# Patient Record
Sex: FEMALE | Race: ASIAN | Employment: UNEMPLOYED | URBAN - METROPOLITAN AREA
[De-identification: names, ages, dates, MRNs, and addresses within clinical notes are randomized per-mention and may not be internally consistent; named-entity substitution may affect disease eponyms.]

---

## 2017-01-29 ENCOUNTER — HOSPITAL ENCOUNTER (EMERGENCY)
Age: 32
Discharge: HOME | End: 2017-01-29
Attending: EMERGENCY MEDICINE

## 2017-01-29 VITALS
RESPIRATION RATE: 20 BRPM | SYSTOLIC BLOOD PRESSURE: 101 MMHG | DIASTOLIC BLOOD PRESSURE: 46 MMHG | HEIGHT: 63 IN | BODY MASS INDEX: 22.15 KG/M2 | HEART RATE: 94 BPM | TEMPERATURE: 100 F | OXYGEN SATURATION: 100 % | WEIGHT: 125 LBS

## 2017-01-29 DIAGNOSIS — R19.7 NAUSEA VOMITING AND DIARRHEA: Primary | ICD-10-CM

## 2017-01-29 DIAGNOSIS — R11.2 NAUSEA VOMITING AND DIARRHEA: Primary | ICD-10-CM

## 2017-01-29 DIAGNOSIS — E87.6 HYPOKALEMIA: ICD-10-CM

## 2017-01-29 LAB
BASOPHILS # BLD AUTO: 0.02 X10(3) UL (ref 0–0.1)
BASOPHILS NFR BLD AUTO: 0.2 %
BILIRUB UR QL STRIP.AUTO: NEGATIVE
BUN BLD-MCNC: 16 MG/DL (ref 8–20)
CALCIUM BLD-MCNC: 8.1 MG/DL (ref 8.3–10.3)
CHLORIDE: 110 MMOL/L (ref 101–111)
CLARITY UR REFRACT.AUTO: CLEAR
CO2: 19 MMOL/L (ref 22–32)
COLOR UR AUTO: YELLOW
CREAT BLD-MCNC: 0.94 MG/DL (ref 0.55–1.02)
EOSINOPHIL # BLD AUTO: 0.03 X10(3) UL (ref 0–0.3)
EOSINOPHIL NFR BLD AUTO: 0.2 %
ERYTHROCYTE [DISTWIDTH] IN BLOOD BY AUTOMATED COUNT: 15 % (ref 11.5–16)
GLUCOSE BLD-MCNC: 104 MG/DL (ref 70–99)
GLUCOSE UR STRIP.AUTO-MCNC: NEGATIVE MG/DL
HCT VFR BLD AUTO: 38.5 % (ref 34–50)
HGB BLD-MCNC: 12.6 G/DL (ref 12–16)
IMMATURE GRANULOCYTE COUNT: 0.05 X10(3) UL (ref 0–1)
IMMATURE GRANULOCYTE RATIO %: 0.4 %
KETONES UR STRIP.AUTO-MCNC: NEGATIVE MG/DL
LEUKOCYTE ESTERASE UR QL STRIP.AUTO: NEGATIVE
LYMPHOCYTES # BLD AUTO: 0.29 X10(3) UL (ref 0.9–4)
LYMPHOCYTES NFR BLD AUTO: 2.4 %
MCH RBC QN AUTO: 27.2 PG (ref 27–33.2)
MCHC RBC AUTO-ENTMCNC: 32.7 G/DL (ref 31–37)
MCV RBC AUTO: 83 FL (ref 81–100)
MONOCYTES # BLD AUTO: 0.32 X10(3) UL (ref 0.1–0.6)
MONOCYTES NFR BLD AUTO: 2.7 %
NEUTROPHIL ABS PRELIM: 11.32 X10 (3) UL (ref 1.3–6.7)
NEUTROPHILS # BLD AUTO: 11.32 X10(3) UL (ref 1.3–6.7)
NEUTROPHILS NFR BLD AUTO: 94.1 %
NITRITE UR QL STRIP.AUTO: NEGATIVE
PH UR STRIP.AUTO: 8.5 [PH] (ref 4.5–8)
PLATELET # BLD AUTO: 342 10(3)UL (ref 150–450)
POCT LOT NUMBER: NORMAL
POCT URINE PREGNANCY: NEGATIVE
POTASSIUM SERPL-SCNC: 3.1 MMOL/L (ref 3.6–5.1)
RBC # BLD AUTO: 4.64 X10(6)UL (ref 3.8–5.1)
RBC UR QL AUTO: NEGATIVE
RED CELL DISTRIBUTION WIDTH-SD: 45.1 FL (ref 35.1–46.3)
SODIUM SERPL-SCNC: 139 MMOL/L (ref 136–144)
SP GR UR STRIP.AUTO: 1.01 (ref 1–1.03)
UROBILINOGEN UR STRIP.AUTO-MCNC: 0.2 MG/DL
WBC # BLD AUTO: 12 X10(3) UL (ref 4–13)

## 2017-01-29 PROCEDURE — 85025 COMPLETE CBC W/AUTO DIFF WBC: CPT | Performed by: EMERGENCY MEDICINE

## 2017-01-29 PROCEDURE — 80048 BASIC METABOLIC PNL TOTAL CA: CPT | Performed by: EMERGENCY MEDICINE

## 2017-01-29 PROCEDURE — 99284 EMERGENCY DEPT VISIT MOD MDM: CPT

## 2017-01-29 PROCEDURE — 81025 URINE PREGNANCY TEST: CPT

## 2017-01-29 PROCEDURE — 96361 HYDRATE IV INFUSION ADD-ON: CPT

## 2017-01-29 PROCEDURE — 81001 URINALYSIS AUTO W/SCOPE: CPT | Performed by: EMERGENCY MEDICINE

## 2017-01-29 PROCEDURE — 96374 THER/PROPH/DIAG INJ IV PUSH: CPT

## 2017-01-29 RX ORDER — ONDANSETRON 4 MG/1
4 TABLET, ORALLY DISINTEGRATING ORAL EVERY 4 HOURS PRN
Qty: 10 TABLET | Refills: 0 | Status: SHIPPED | OUTPATIENT
Start: 2017-01-29 | End: 2017-02-05

## 2017-01-29 RX ORDER — ONDANSETRON 2 MG/ML
4 INJECTION INTRAMUSCULAR; INTRAVENOUS ONCE
Status: COMPLETED | OUTPATIENT
Start: 2017-01-29 | End: 2017-01-29

## 2017-01-29 RX ORDER — POTASSIUM CHLORIDE 20 MEQ/1
40 TABLET, EXTENDED RELEASE ORAL ONCE
Status: COMPLETED | OUTPATIENT
Start: 2017-01-29 | End: 2017-01-29

## 2017-01-29 NOTE — ED PROVIDER NOTES
Patient Seen in: THE [facility] Emergency Department In [place]    History   Patient presents with:  Nausea/Vomiting/Diarrhea (gastrointestinal)    Stated Complaint: vomiting and diarrhea    HPI    There is a 79-year-old female coming with complaints of vomit Alcohol Use: No                Review of Systems    Positive for stated complaint: vomiting and diarrhea  Other systems are as noted in HPI. Constitutional and vital signs reviewed.       All other systems reviewed and negative except as note Protein Urine 30 mg/dL (*)     All other components within normal limits   CBC W/ DIFFERENTIAL - Abnormal; Notable for the following:     Neutrophil Absolute Prelim 11.32 (*)     Neutrophil Absolute 11.32 (*)     Lymphocyte Absolute 0.29 (*)     All other

## 2018-07-08 ENCOUNTER — HOSPITAL ENCOUNTER (EMERGENCY)
Age: 33
Discharge: HOME | End: 2018-07-08
Attending: EMERGENCY MEDICINE

## 2018-07-08 VITALS
DIASTOLIC BLOOD PRESSURE: 55 MMHG | BODY MASS INDEX: 24.63 KG/M2 | SYSTOLIC BLOOD PRESSURE: 127 MMHG | HEART RATE: 65 BPM | OXYGEN SATURATION: 98 % | TEMPERATURE: 98 F | RESPIRATION RATE: 18 BRPM | HEIGHT: 63 IN | WEIGHT: 139 LBS

## 2018-07-08 DIAGNOSIS — G56.01 CARPAL TUNNEL SYNDROME OF RIGHT WRIST: Primary | ICD-10-CM

## 2018-07-08 PROCEDURE — 99282 EMERGENCY DEPT VISIT SF MDM: CPT

## 2018-07-08 RX ORDER — IBUPROFEN 600 MG/1
600 TABLET ORAL ONCE
Status: COMPLETED | OUTPATIENT
Start: 2018-07-08 | End: 2018-07-08

## 2018-07-08 NOTE — ED PROVIDER NOTES
Patient Seen in: [facility] Emergency Department In [place]    History   Patient presents with:  Pain (neurologic)    Stated Complaint: right wrist pain since last night, denies injury    HPI    74-year-old female presents emergency room with chief compla HPI.  Constitutional and vital signs reviewed. All other systems reviewed and negative except as noted above.     Physical Exam   ED Triage Vitals [07/08/18 0843]  BP: 127/55  Pulse: 65  Resp: 18  Temp: 98 °F (36.7 °C)  Temp src: Temporal  SpO2: 98 % medical condition was not or was no longer present. There was no indication for further evaluation, treatment, or admission on an emergency basis.   Comprehensive verbal and written discharge and follow-up instructions were provided to help prevent relapse

## 2018-07-08 NOTE — ED INITIAL ASSESSMENT (HPI)
C/o right forearm pain radiates to the hand started last night while at work. States she's always typing/computer at all times at work. No injury.

## 2019-08-28 ENCOUNTER — HOSPITAL ENCOUNTER (EMERGENCY)
Facility: HOSPITAL | Age: 34
Discharge: LEFT BEFORE DISCHARGE | End: 2019-08-28

## 2019-08-28 VITALS
HEART RATE: 84 BPM | DIASTOLIC BLOOD PRESSURE: 69 MMHG | SYSTOLIC BLOOD PRESSURE: 101 MMHG | OXYGEN SATURATION: 99 % | TEMPERATURE: 98 F | BODY MASS INDEX: 25 KG/M2 | WEIGHT: 138.88 LBS | RESPIRATION RATE: 16 BRPM

## 2019-08-28 DIAGNOSIS — G40.909 SEIZURE DISORDER (HCC): Primary | ICD-10-CM

## 2019-08-28 LAB
ALBUMIN SERPL-MCNC: 4.1 G/DL (ref 3.4–5)
ALBUMIN/GLOB SERPL: 1 {RATIO} (ref 1–2)
ALP LIVER SERPL-CCNC: 44 U/L (ref 37–98)
ALT SERPL-CCNC: 11 U/L (ref 13–56)
ANION GAP SERPL CALC-SCNC: 10 MMOL/L (ref 0–18)
AST SERPL-CCNC: 11 U/L (ref 15–37)
BASOPHILS # BLD AUTO: 0.08 X10(3) UL (ref 0–0.2)
BASOPHILS NFR BLD AUTO: 1.2 %
BILIRUB SERPL-MCNC: 0.3 MG/DL (ref 0.1–2)
BUN BLD-MCNC: 12 MG/DL (ref 7–18)
BUN/CREAT SERPL: 12.6 (ref 10–20)
CALCIUM BLD-MCNC: 8.9 MG/DL (ref 8.5–10.1)
CHLORIDE SERPL-SCNC: 108 MMOL/L (ref 98–112)
CO2 SERPL-SCNC: 21 MMOL/L (ref 21–32)
CREAT BLD-MCNC: 0.95 MG/DL (ref 0.55–1.02)
DEPRECATED RDW RBC AUTO: 42.4 FL (ref 35.1–46.3)
EOSINOPHIL # BLD AUTO: 0.36 X10(3) UL (ref 0–0.7)
EOSINOPHIL NFR BLD AUTO: 5.2 %
ERYTHROCYTE [DISTWIDTH] IN BLOOD BY AUTOMATED COUNT: 12.8 % (ref 11–15)
GLOBULIN PLAS-MCNC: 4 G/DL (ref 2.8–4.4)
GLUCOSE BLD-MCNC: 85 MG/DL (ref 70–99)
HCT VFR BLD AUTO: 39.6 % (ref 35–48)
HGB BLD-MCNC: 13.3 G/DL (ref 12–16)
IMM GRANULOCYTES # BLD AUTO: 0.02 X10(3) UL (ref 0–1)
IMM GRANULOCYTES NFR BLD: 0.3 %
LYMPHOCYTES # BLD AUTO: 2.15 X10(3) UL (ref 1–4)
LYMPHOCYTES NFR BLD AUTO: 31.3 %
M PROTEIN MFR SERPL ELPH: 8.1 G/DL (ref 6.4–8.2)
MCH RBC QN AUTO: 30.2 PG (ref 26–34)
MCHC RBC AUTO-ENTMCNC: 33.6 G/DL (ref 31–37)
MCV RBC AUTO: 89.8 FL (ref 80–100)
MONOCYTES # BLD AUTO: 0.48 X10(3) UL (ref 0.1–1)
MONOCYTES NFR BLD AUTO: 7 %
NEUTROPHILS # BLD AUTO: 3.78 X10 (3) UL (ref 1.5–7.7)
NEUTROPHILS # BLD AUTO: 3.78 X10(3) UL (ref 1.5–7.7)
NEUTROPHILS NFR BLD AUTO: 55 %
OSMOLALITY SERPL CALC.SUM OF ELEC: 287 MOSM/KG (ref 275–295)
PLATELET # BLD AUTO: 342 10(3)UL (ref 150–450)
POTASSIUM SERPL-SCNC: 3.6 MMOL/L (ref 3.5–5.1)
RBC # BLD AUTO: 4.41 X10(6)UL (ref 3.8–5.3)
SODIUM SERPL-SCNC: 139 MMOL/L (ref 136–145)
WBC # BLD AUTO: 6.9 X10(3) UL (ref 4–11)

## 2019-08-28 PROCEDURE — 93005 ELECTROCARDIOGRAM TRACING: CPT

## 2019-08-28 PROCEDURE — 80053 COMPREHEN METABOLIC PANEL: CPT

## 2019-08-28 PROCEDURE — 85025 COMPLETE CBC W/AUTO DIFF WBC: CPT

## 2019-08-28 NOTE — ED NOTES
Called to see patient because pt has decieded to go home without being seen by a md..  Pt is a/ao x3 has her boyfriend present to drive her home. Tim Tomlinson

## 2019-08-28 NOTE — ED INITIAL ASSESSMENT (HPI)
Pt reports \"I had a seizure at my sister's apartment\". Pt reports hx of seizures. Not on meds for seizures. Pt A&OX4.

## 2019-08-28 NOTE — ED PROVIDER NOTES
Patient Seen in: [facility] Emergency Department    History   Patient presents with:  Seizure Disorder (neurologic)    Stated Complaint:     Upon arriving to patient's room patient decides that she does not want to be seen and wants to go AGAINST ME kg/m²         Physical Exam    Unable to perform due to patient wanted to leave right, leave [address]    ED Course     Labs Reviewed   COMP METABOLIC PANEL (14) - Abnormal; Notable for the following components:       Result Value    AST 11 (*

## 2019-08-29 LAB
ATRIAL RATE: 75 BPM
P AXIS: 63 DEGREES
P-R INTERVAL: 132 MS
Q-T INTERVAL: 410 MS
QRS DURATION: 96 MS
QTC CALCULATION (BEZET): 457 MS
R AXIS: 62 DEGREES
T AXIS: 53 DEGREES
VENTRICULAR RATE: 75 BPM

## 2019-11-06 ENCOUNTER — APPOINTMENT (OUTPATIENT)
Dept: CT IMAGING | Age: 34
DRG: 093 | End: 2019-11-06
Attending: EMERGENCY MEDICINE

## 2019-11-06 ENCOUNTER — HOSPITAL ENCOUNTER (INPATIENT)
Facility: HOSPITAL | Age: 34
LOS: 2 days | Discharge: HOME | DRG: 093 | End: 2019-11-08
Attending: EMERGENCY MEDICINE | Admitting: HOSPITALIST

## 2019-11-06 DIAGNOSIS — G43.809 OTHER MIGRAINE WITHOUT STATUS MIGRAINOSUS, NOT INTRACTABLE: ICD-10-CM

## 2019-11-06 DIAGNOSIS — Q28.2 AVM (ARTERIOVENOUS MALFORMATION) BRAIN: Primary | ICD-10-CM

## 2019-11-06 PROBLEM — R56.9 FOCAL SEIZURES (HCC): Chronic | Status: ACTIVE | Noted: 2019-11-06

## 2019-11-06 PROCEDURE — 99223 1ST HOSP IP/OBS HIGH 75: CPT | Performed by: HOSPITALIST

## 2019-11-06 PROCEDURE — 70496 CT ANGIOGRAPHY HEAD: CPT | Performed by: EMERGENCY MEDICINE

## 2019-11-06 PROCEDURE — 70450 CT HEAD/BRAIN W/O DYE: CPT | Performed by: EMERGENCY MEDICINE

## 2019-11-06 RX ORDER — SODIUM PHOSPHATE, DIBASIC AND SODIUM PHOSPHATE, MONOBASIC 7; 19 G/133ML; G/133ML
1 ENEMA RECTAL ONCE AS NEEDED
Status: DISCONTINUED | OUTPATIENT
Start: 2019-11-06 | End: 2019-11-08

## 2019-11-06 RX ORDER — MORPHINE SULFATE 4 MG/ML
1 INJECTION, SOLUTION INTRAMUSCULAR; INTRAVENOUS EVERY 2 HOUR PRN
Status: DISCONTINUED | OUTPATIENT
Start: 2019-11-06 | End: 2019-11-08

## 2019-11-06 RX ORDER — BUTALBITAL, ACETAMINOPHEN AND CAFFEINE 50; 325; 40 MG/1; MG/1; MG/1
1 TABLET ORAL EVERY 4 HOURS PRN
Status: DISCONTINUED | OUTPATIENT
Start: 2019-11-06 | End: 2019-11-08

## 2019-11-06 RX ORDER — DOCUSATE SODIUM 100 MG/1
100 CAPSULE, LIQUID FILLED ORAL 2 TIMES DAILY
Status: DISCONTINUED | OUTPATIENT
Start: 2019-11-06 | End: 2019-11-08

## 2019-11-06 RX ORDER — ONDANSETRON 2 MG/ML
4 INJECTION INTRAMUSCULAR; INTRAVENOUS ONCE
Status: COMPLETED | OUTPATIENT
Start: 2019-11-06 | End: 2019-11-06

## 2019-11-06 RX ORDER — METOCLOPRAMIDE HYDROCHLORIDE 5 MG/ML
10 INJECTION INTRAMUSCULAR; INTRAVENOUS EVERY 8 HOURS PRN
Status: DISCONTINUED | OUTPATIENT
Start: 2019-11-06 | End: 2019-11-08

## 2019-11-06 RX ORDER — BUTALBITAL, ACETAMINOPHEN AND CAFFEINE 50; 325; 40 MG/1; MG/1; MG/1
1-2 TABLET ORAL EVERY 4 HOURS PRN
Qty: 20 TABLET | Refills: 0 | Status: SHIPPED | OUTPATIENT
Start: 2019-11-06 | End: 2019-11-13

## 2019-11-06 RX ORDER — MORPHINE SULFATE 4 MG/ML
2 INJECTION, SOLUTION INTRAMUSCULAR; INTRAVENOUS EVERY 2 HOUR PRN
Status: DISCONTINUED | OUTPATIENT
Start: 2019-11-06 | End: 2019-11-08

## 2019-11-06 RX ORDER — DIPHENHYDRAMINE HYDROCHLORIDE 50 MG/ML
50 INJECTION INTRAMUSCULAR; INTRAVENOUS ONCE
Status: COMPLETED | OUTPATIENT
Start: 2019-11-06 | End: 2019-11-06

## 2019-11-06 RX ORDER — KETOROLAC TROMETHAMINE 30 MG/ML
30 INJECTION, SOLUTION INTRAMUSCULAR; INTRAVENOUS ONCE
Status: DISCONTINUED | OUTPATIENT
Start: 2019-11-06 | End: 2019-11-08

## 2019-11-06 RX ORDER — BISACODYL 10 MG
10 SUPPOSITORY, RECTAL RECTAL
Status: DISCONTINUED | OUTPATIENT
Start: 2019-11-06 | End: 2019-11-08

## 2019-11-06 RX ORDER — ACETAMINOPHEN 325 MG/1
650 TABLET ORAL EVERY 6 HOURS PRN
Status: DISCONTINUED | OUTPATIENT
Start: 2019-11-06 | End: 2019-11-08

## 2019-11-06 RX ORDER — ONDANSETRON 2 MG/ML
4 INJECTION INTRAMUSCULAR; INTRAVENOUS EVERY 6 HOURS PRN
Status: DISCONTINUED | OUTPATIENT
Start: 2019-11-06 | End: 2019-11-08

## 2019-11-06 RX ORDER — POLYETHYLENE GLYCOL 3350 17 G/17G
17 POWDER, FOR SOLUTION ORAL DAILY PRN
Status: DISCONTINUED | OUTPATIENT
Start: 2019-11-06 | End: 2019-11-08

## 2019-11-06 RX ORDER — LORAZEPAM 2 MG/ML
2 INJECTION INTRAMUSCULAR
Status: DISCONTINUED | OUTPATIENT
Start: 2019-11-06 | End: 2019-11-08

## 2019-11-06 RX ORDER — DEXAMETHASONE SODIUM PHOSPHATE 4 MG/ML
10 VIAL (ML) INJECTION ONCE
Status: DISCONTINUED | OUTPATIENT
Start: 2019-11-06 | End: 2019-11-08

## 2019-11-06 RX ORDER — MORPHINE SULFATE 4 MG/ML
4 INJECTION, SOLUTION INTRAMUSCULAR; INTRAVENOUS EVERY 2 HOUR PRN
Status: DISCONTINUED | OUTPATIENT
Start: 2019-11-06 | End: 2019-11-08

## 2019-11-06 NOTE — ED PROVIDER NOTES
Patient Seen in: [facility] In [place]      History   Patient presents with:  Headache (neurologic)    Stated Complaint: headache for 5 days, hx of  seizures- has EVM    HPI    37-year-old female presents emergency department complain Cyst (Left?)   • REMOVAL OF OVARIAN CYST(S)     • TUBAL LIGATION                      Social History    Tobacco Use      Smoking status: Former Smoker        Packs/day: 0.50        Years: 6.00        Pack years: 3        Types: Cigarettes        Quit date: other components within normal limits   BASIC METABOLIC PANEL (8) - Normal   PTT, ACTIVATED - Normal   PROTHROMBIN TIME (PT) - Normal   CBC WITH DIFFERENTIAL WITH PLATELET    Narrative:      The following orders were created for panel order CBC WITH DIFFERE I personally reviewed the images and looked at the films personally myself. Agree with radiology assessment. Went over films with patient.     After initial films look like they do look a little bigger I discussed case with neuro interventionalist and imaging. The patient was frequently reevaluated, and I made frequent checks of  vital signs and monitor. Nursing notes were reviewed. Critical care time was[60 minutes], and exclusive of procedures.     MDM     Patient was evaluated in the emergency

## 2019-11-07 ENCOUNTER — APPOINTMENT (OUTPATIENT)
Dept: MRI IMAGING | Facility: HOSPITAL | Age: 34
DRG: 093 | End: 2019-11-07
Attending: NURSE PRACTITIONER

## 2019-11-07 PROCEDURE — 70553 MRI BRAIN STEM W/O & W/DYE: CPT | Performed by: NURSE PRACTITIONER

## 2019-11-07 PROCEDURE — 99232 SBSQ HOSP IP/OBS MODERATE 35: CPT | Performed by: HOSPITALIST

## 2019-11-07 PROCEDURE — 99223 1ST HOSP IP/OBS HIGH 75: CPT | Performed by: NURSE PRACTITIONER

## 2019-11-07 RX ORDER — DIPHENHYDRAMINE HCL 50 MG
50 CAPSULE ORAL ONCE
Status: COMPLETED | OUTPATIENT
Start: 2019-11-07 | End: 2019-11-08

## 2019-11-07 RX ORDER — DIPHENHYDRAMINE HCL 25 MG
25 CAPSULE ORAL ONCE
Status: COMPLETED | OUTPATIENT
Start: 2019-11-07 | End: 2019-11-07

## 2019-11-07 RX ORDER — POTASSIUM CHLORIDE 20 MEQ/1
40 TABLET, EXTENDED RELEASE ORAL ONCE
Status: COMPLETED | OUTPATIENT
Start: 2019-11-07 | End: 2019-11-07

## 2019-11-07 NOTE — PROGRESS NOTES
NURSING ADMISSION NOTE      Patient admitted via Cart  Oriented to room. Safety precautions initiated. Bed in low position. Call light in reach.     Pt A&O Room Air  Pt admitted for headache 5x day  Seizure prcaution  Neuro consulted via ER  Reg diet

## 2019-11-07 NOTE — PROGRESS NOTES
[facility]  Progress Note     Maribell Ashton Patient Status:  Inpatient    1985 MRN [id number]   [facility] 3NE-A Attending Keshawn Melgoza MD   Hosp Day # 1 PCP None Pcp     Chief Complaint: CHEN  S:  Patient denies chest TBD  Discharge is dependent on: Neuro w/u  At this point Ms. Shekhar Juares is expected to be discharge to: home     Robe Mason MD

## 2019-11-07 NOTE — PLAN OF CARE
NURSING ADMISSION NOTE      Patient admitted via Wheelchair  Oriented to room. Safety precautions initiated. Bed in low position. Call light in reach. Received patient who is a/ox4. VSS.  MD Dr. Patricia Candelaria paged for orders

## 2019-11-07 NOTE — H&P
[facility]  History and Physical     Harriet Pritchardconcepcion Russell Patient Status:  Inpatient    1985 MRN [id number]   [facility] 3NE-A Attending Army Florencio MD   Hosp Day # 0 PCP None Pcp     Chief Complaint: Headache    History of OTHER SURGICAL HISTORY  02/2005    Embolization of AV malformation   • OTHER SURGICAL HISTORY  06/2009    Removal Ovarian Cyst (Left?)   • REMOVAL OF OVARIAN CYST(S)     • TUBAL LIGATION         Social History:  reports that she quit smoking about 5 years and affect.       Diagnostic Data:      Labs:  Recent Labs   Lab 11/06/19  1031   WBC 5.1   HGB 13.3   MCV 90.0   .0       Recent Labs   Lab 11/06/19  1031   GLU 85   BUN 11   CREATSERUM 0.70   GFRAA 133   GFRNAA 115   CA 9.4   ALB 4.1      K 3

## 2019-11-07 NOTE — DIETARY NOTE
[address] Ban[address]     Admitting diagnosis:  AVM (arteriovenous malformation) brain [Q28.2]  Other migraine without status migrainosus, not intractable [G43.809]    Ht: 160 cm (5' 3\")  Wt: 63.5 kg (140 lb).  This is

## 2019-11-07 NOTE — CONSULTS
[id number] [address] Interventional Neuro Radiology Consult    Harriet Miller Stage Patient Status:  Inpatient    1985 MRN [id number]   [facility] 3NE-A Attending Catrachito Solis MD   Hosp Day # 1 PCP None Pcp     REASON FOR CONSULTATION seizure progressed to tightening of the thought and then a grand mal seizure. She was evaluated at an outside hospital.  She was suggested to start Depakote, however, due to lack of insurance she did not initiate therapy.   She had a similar instance in [place] Intravenous, Q2H PRN    Or  morphINE sulfate (PF) 4 MG/ML injection 4 mg, 4 mg, Intravenous, Q2H PRN  ondansetron HCl (ZOFRAN) injection 4 mg, 4 mg, Intravenous, Q6H PRN  Metoclopramide HCl (REGLAN) injection 10 mg, 10 mg, Intravenous, Q8H PRN  docusate so 11/06/2019     11/06/2019    CO2 26.0 11/06/2019    GLU 85 11/06/2019    CA 9.4 11/06/2019    ALB 4.1 11/06/2019    ALKPHO 58 11/06/2019    BILT 0.3 11/06/2019    TP 8.2 11/06/2019    AST 10 11/06/2019    ALT 15 11/06/2019     IMAGING:  CTA 11/6/2019

## 2019-11-08 ENCOUNTER — APPOINTMENT (OUTPATIENT)
Dept: INTERVENTIONAL RADIOLOGY/VASCULAR | Facility: HOSPITAL | Age: 34
DRG: 093 | End: 2019-11-08
Attending: NURSE PRACTITIONER

## 2019-11-08 VITALS
SYSTOLIC BLOOD PRESSURE: 111 MMHG | DIASTOLIC BLOOD PRESSURE: 70 MMHG | RESPIRATION RATE: 25 BRPM | HEIGHT: 63 IN | WEIGHT: 140 LBS | TEMPERATURE: 98 F | OXYGEN SATURATION: 98 % | BODY MASS INDEX: 24.8 KG/M2 | HEART RATE: 77 BPM

## 2019-11-08 PROCEDURE — B313YZZ FLUOROSCOPY OF RIGHT COMMON CAROTID ARTERY USING OTHER CONTRAST: ICD-10-PCS | Performed by: RADIOLOGY

## 2019-11-08 PROCEDURE — 99238 HOSP IP/OBS DSCHRG MGMT 30/<: CPT | Performed by: HOSPITALIST

## 2019-11-08 PROCEDURE — B31QYZZ FLUOROSCOPY OF CERVICO-CEREBRAL ARCH USING OTHER CONTRAST: ICD-10-PCS | Performed by: RADIOLOGY

## 2019-11-08 PROCEDURE — B317YZZ FLUOROSCOPY OF LEFT INTERNAL CAROTID ARTERY USING OTHER CONTRAST: ICD-10-PCS | Performed by: RADIOLOGY

## 2019-11-08 PROCEDURE — B31BYZZ FLUOROSCOPY OF LEFT EXTERNAL CAROTID ARTERY USING OTHER CONTRAST: ICD-10-PCS | Performed by: RADIOLOGY

## 2019-11-08 PROCEDURE — B31FYZZ FLUOROSCOPY OF LEFT VERTEBRAL ARTERY USING OTHER CONTRAST: ICD-10-PCS | Performed by: RADIOLOGY

## 2019-11-08 PROCEDURE — 99253 IP/OBS CNSLTJ NEW/EST LOW 45: CPT | Performed by: OTHER

## 2019-11-08 RX ORDER — PROTAMINE SULFATE 10 MG/ML
INJECTION, SOLUTION INTRAVENOUS
Status: COMPLETED
Start: 2019-11-08 | End: 2019-11-08

## 2019-11-08 RX ORDER — LIDOCAINE HYDROCHLORIDE 10 MG/ML
INJECTION, SOLUTION INFILTRATION; PERINEURAL
Status: COMPLETED
Start: 2019-11-08 | End: 2019-11-08

## 2019-11-08 RX ORDER — MIDAZOLAM HYDROCHLORIDE 1 MG/ML
INJECTION INTRAMUSCULAR; INTRAVENOUS
Status: COMPLETED
Start: 2019-11-08 | End: 2019-11-08

## 2019-11-08 RX ORDER — LORAZEPAM 2 MG/ML
1 INJECTION INTRAMUSCULAR ONCE
Status: COMPLETED | OUTPATIENT
Start: 2019-11-08 | End: 2019-11-08

## 2019-11-08 RX ORDER — HEPARIN SODIUM 5000 [USP'U]/ML
INJECTION, SOLUTION INTRAVENOUS; SUBCUTANEOUS
Status: COMPLETED
Start: 2019-11-08 | End: 2019-11-08

## 2019-11-08 NOTE — PLAN OF CARE
Assumed care at 0730, A/Ox4, room air, tele, refused colace, seiz precautions, elec protocol, PRN ativan for seizures.  Reported having petit mal seiz yesterday, pt reports stress/anxiety brings on seizures, hasnt taken seizure meds since jan 2018. 20ga sal

## 2019-11-08 NOTE — PROGRESS NOTES
Received patient from lab to be DC from [facility] holding . VSS , right groin C/d/i. Denies pain, family at bedside. Per Luisa, neuro intervention APN, change of plans, neuro needs to see her prior to DC. Pt will go back to 3612 to be DC from there.  Report called

## 2019-11-08 NOTE — PROCEDURES
[facility]  Neurointerventional Surgery Operative Report  Tata Soto Patient Status:  Inpatient    1985 MRN [id number]   [facility] 3NE-A Attending Mono Morales MD   Hosp Day # 2 PCP None Pcp     Procedure: cervicocereb 11/8/2019    : Dr Jacques Driver, Interventional Neuroradiology / Neurointerventional Surgery     INDICATIONS:    Patient with a known left cerebral AVM with a plan for treatment 5 years ago but has not followed up with treatment plan and now ret assisted in monitoring the patient’s level of consciousness and physiological status throughout the procedure, and had no other duties during the procedure. The names of the IR nurses who served in this capacity are also noted in the IR flowsheet in [place]. ICA, MCA and TAMMY terirotries with cross flow through the large ACOM artery to supply the normal left TAMMY territory and some back filling of the left TAMMY A1 segment.  There is no cross filling of the left MCA either by flow across the left ICA terminus or by normal.    CONCLUSION:      No significant angiographic changed in the left sylvian / posterior frontal opercular AVM, slightly <3cm diameter, eloquent, superficial draining, SM 2 AVM. The Superolateral nidal varix is unchanged.  Attenuation of the main jo

## 2019-11-08 NOTE — PLAN OF CARE
Patient A&O x 4. Went for cerebral angio today. Stated anxiety prior to procedure and patient was ordered 1 mg ativan prior to leaving unit. Received patient at  from holding.  Right Groin site intact, no signs of bleeding, educated on importance of bed post-discharge preferences of patient/family/discharge partner  - Complete POLST form as appropriate  - Assess patient's ability to be responsible for managing their own health  - Refer to Case Management Department for coordinating discharge planning if t

## 2019-11-08 NOTE — PROGRESS NOTES
[facility]  Interventional Neuroradiology Progress Note    Cory Rosales Patient Status:  Inpatient    1985 MRN [id number]   [facility] MD   Hosp Day # 2 PCP None Pcp       Subj measures approximately 3.5 x 2.6 x 2.6 versus 3.4 x 2.6 x 2.4 cm, AP x T x cc dimension respectively. Assessment:  1. Left frontal-temporal lobe AVM - since age 14, previously embolized at the time of discovery  2.  New grand mal seizures since May 2019

## 2019-11-08 NOTE — PLAN OF CARE
NURSING DISCHARGE NOTE    Discharged Home via Wheelchair. Accompanied by Family member  Belongings Taken by patient/family    Patient given discharge paperwork.  Educated on warning signs for femoral bleeding, medications, and plan of care following di resources  Description  INTERVENTIONS:  - Identify barriers to discharge w/pt and caregiver  - Include patient/family/discharge partner in discharge planning  - Arrange for needed discharge resources and transportation as appropriate  - Identify discharge

## 2019-11-08 NOTE — PLAN OF CARE
A&Ox4. Room air. NSR on tele. No complaints of pain. Seizure precautions in place. NPO since midnight for cerebral angiogram today. No further needs. Staff will continue to monitor.        Problem: DISCHARGE PLANNING  Goal: Discharge to home or other facili

## 2019-11-08 NOTE — PROGRESS NOTES
[facility]  Progress Note     Angely Healex Patient Status:  Inpatient    1985 MRN [id number]   [facility] 3NE-A Attending India Herrera MD   Hosp Day # 2 PCP None Pcp     Chief Complaint: CHEN  S:  Patient denies chest Prophylaxis: ambualte, low risk   · CODE status: full  Will the patient be referred to TCC on discharge?: no  Estimated date of discharge: TBD  Discharge is dependent on: Neuro w/u  At this point Ms. Luz Jones is expected to be discharge to: home     Luke

## 2019-11-08 NOTE — PROCEDURES
[facility]  Pre-Procedural Note  Mesa Needle Patient Status:  Inpatient    1985 MRN [id number]   [facility] 3NE-A Attending Fern Gresham MD   Hosp Day # 2 PCP None Pcp     Procedure: Cerebral angiography    Indication fo eyes  Got steroid prep.     Mental Status:    Normal    Health Status:    Past Medical History:   Diagnosis Date   • ASCUS on Pap smear    • AVM (arteriovenous malformation)    • OTHER DISEASES     varicella as a child   • OTHER DISEASES     AV Malformation

## 2019-11-09 NOTE — DISCHARGE SUMMARY
[facility]  DISCHARGE SUMMARY     Harriet Dougherty Patient Status:  Inpatient    1985 MRN [id number]   [facility] 3NE-A Attending No att. providers found   Hosp Day # 2 PCP None Pcp     Date of Admission: 2019  Date of D syncope. No visual changes auditory changes. No auditory or visual hallucinations. Patient denies any numbness or tingling extremities or any focalized weakness. Patient denies any numbness or tingling in the face no facial asymmetry.     Brief Synopsis [id number]    In 1 week    ----------------------------------------------------------------------------------------  PATIENT DISCHARGE INSTRUCTIONS: See electronic chart    Carla Peters MD 11/9/2019    Time spent:  > 30 minutes

## 2019-11-09 NOTE — CONSULTS
[facility]    Report of Consultation    Harriet Ramirez Patient Status:  Inpatient    1985 MRN [id number]   [facility] 3NE-A Attending No att. providers found   Hosp Day # 2 PCP None Pcp     Date of Admission:  2019  Jose G • AVM (arteriovenous malformation)    • OTHER DISEASES     varicella as a child   • OTHER DISEASES     AV Malformation, Dx. 08/2004   • Pelvic mass    • PONV (postoperative nausea and vomiting)    • Seizure disorder [facility])      Past Surgical History:   Pr bilaterally. The rest of the cranial nerves are grossly intact. Sensation to light touch is intact bilaterally. Motor:  No arm or leg weakness noted. Finger-to-nose coordination is intact. Gait deferred.     Diagnostic Data:        Prior pertinent labo discussed. Risk of seizure was discussed. It was ensured patient would have refills of medication until she can have follow up.  Patient expressed wish to think about the medication first.       Thank you for allowing me to participate in the evaluation of

## 2019-12-09 ENCOUNTER — TELEPHONE (OUTPATIENT)
Dept: SURGERY | Facility: CLINIC | Age: 34
End: 2019-12-09

## 2019-12-09 ENCOUNTER — OFFICE VISIT (OUTPATIENT)
Dept: SURGERY | Facility: CLINIC | Age: 34
End: 2019-12-09
Payer: MEDICAID

## 2019-12-09 VITALS
SYSTOLIC BLOOD PRESSURE: 112 MMHG | WEIGHT: 140 LBS | BODY MASS INDEX: 24.8 KG/M2 | HEIGHT: 63 IN | HEART RATE: 66 BPM | DIASTOLIC BLOOD PRESSURE: 62 MMHG

## 2019-12-09 DIAGNOSIS — Q27.30 AVM (ARTERIOVENOUS MALFORMATION): Primary | ICD-10-CM

## 2019-12-09 NOTE — PROGRESS NOTES
Pt is here for follow up for cerebral angiogram. IR angiogram on 11/7/19. No gramal seizures. Focal seizures since hospital x6. Groin sight. No swelling, no fevers, chills, no appetite change.        Review of Systems:    Hand Dominance: right  Gene

## 2019-12-09 NOTE — PROGRESS NOTES
I interviewed the patient again, went through her history,evolution of R patial seizures to generalized seizures (3 since Summer 2019, last in September) L opercular AVM with slight changes on angiogram (larger aneurysm, larger draining vein), stable MR ap shira later this week to make sure the patient is following up. We will also phone the patient in a week to make sure she does not fall through the system again.

## 2019-12-09 NOTE — TELEPHONE ENCOUNTER
Patient reminder placed for, \"call patient in 1 week to follow up on plan of care. She needs to establish care at [facility] for AVM treatment. \"

## 2019-12-09 NOTE — PROGRESS NOTES
[facility]  Interventional Neuroradiology Progress Note    Harriet Jones     1985 MRN [id number]   Location [address] [facility], [address] [place] [address] PCP Has not established care with a provider     Subjective:   We had the pleasure bilaterally.     Neurologic:   Mental status: Oriented to person, place, and time   Speech: Fluent, no dysarthria  Memory and comprehension: Intact   Cranial Nerves: VFF, PERRL 3mm brisk, EOMI, no nystagmus, facial sensation intact, face symmetric, tongue m AM  Spectre [id number]

## 2019-12-09 NOTE — TELEPHONE ENCOUNTER
Hi please place reminder to call patient in 1 week to follow up on plan of care. She needs to establish care at [facility] for AVM treatment.

## 2019-12-16 NOTE — TELEPHONE ENCOUNTER
Attempted to reach patient regarding information per Himanshu November, APN. No option to leave VM. Left MyChart message with necessary information.

## 2020-01-06 ENCOUNTER — TELEPHONE (OUTPATIENT)
Dept: SURGERY | Facility: CLINIC | Age: 35
End: 2020-01-06

## 2020-01-06 NOTE — TELEPHONE ENCOUNTER
[facility]    Called patient on behalf of Enedina Lockhart. When patient returns call, relay the following information from [facility], can we have a nurse call to Darwin Lopez [address] to inquire about what she is doing in regards to treatment for her AVM.   When eddie

## 2020-01-06 NOTE — TELEPHONE ENCOUNTER
Hi, can we have a nurse call to [place] to inquire about what she is doing in regards to treatment for her AVM. When we last saw her we recommended follow up at [facility]. \"I have told the patient to call Dr Albert Jasmine clinic and organize a visit.  She

## 2020-01-06 NOTE — TELEPHONE ENCOUNTER
Patient returned call and offered information regarding her care.   Patient stated that:    -She saw Dr. Bulmaro Mckenna today at [facility].    -The proposed plan she and Dr. Bulmaro Mckenna established is for her to contact Dr. Gary Aguayo (rad onc) at [facility] to discuss/i

## 2020-01-17 ENCOUNTER — OFFICE VISIT (OUTPATIENT)
Dept: NEUROLOGY | Facility: CLINIC | Age: 35
End: 2020-01-17
Payer: MEDICAID

## 2020-01-17 VITALS
WEIGHT: 157 LBS | RESPIRATION RATE: 16 BRPM | SYSTOLIC BLOOD PRESSURE: 122 MMHG | HEART RATE: 68 BPM | DIASTOLIC BLOOD PRESSURE: 72 MMHG | BODY MASS INDEX: 28 KG/M2

## 2020-01-17 DIAGNOSIS — G40.109 SYMPTOMATIC LOCALIZATION-RELATED EPILEPSY (HCC): Primary | ICD-10-CM

## 2020-01-17 DIAGNOSIS — G44.1 VASCULAR HEADACHE: ICD-10-CM

## 2020-01-17 DIAGNOSIS — Q28.2 AVM (ARTERIOVENOUS MALFORMATION) BRAIN: ICD-10-CM

## 2020-01-17 PROCEDURE — 99214 OFFICE O/P EST MOD 30 MIN: CPT | Performed by: OTHER

## 2020-01-17 PROCEDURE — 1111F DSCHRG MED/CURRENT MED MERGE: CPT | Performed by: OTHER

## 2020-01-17 RX ORDER — LAMOTRIGINE 25 MG/1
TABLET ORAL
Qty: 120 TABLET | Refills: 0 | Status: SHIPPED | OUTPATIENT
Start: 2020-01-17 | End: 2020-03-06

## 2020-01-17 NOTE — PROGRESS NOTES
[facility] in [place]  Neurology - Clinic Follow up  2020, 10:46 AM     Harriet Russell Patient Status:  No patient class for patient encounter    1985 MRN [id number]   Location [unfilled] PCP Nathan Spain MD PAST MEDICAL HISTORY:   Past Medical History:   Diagnosis Date   • ASCUS on Pap smear    • AVM (arteriovenous malformation)    • OTHER DISEASES     varicella as a child   • OTHER DISEASES     AV Malformation, Dx. 08/2004   • Pelvic mass    • PONV (po kg/m². Vitals reviewed. Physical Exam:  General Exam:  Appearance: well developed,  nurished , in no acute distress,   Pink Conjunctiva;   Neck: supple, no bruits;  Cardiac: S1/S2 RRR  Lungs: CTA B/L;  Musculoskeletal: no joint tenderness, others see neuro seizures  Discussed different seizure medication options  Depakote or Lamictal are good options given concomitant headache control and lack of mood side effects  Downside Lamictal needs to be titrated slow, discussed rare side effect of life threatening ra

## 2020-01-17 NOTE — PROGRESS NOTES
Patient states focal seizures since leaving the hospital. Patient states roughly around 15-20. Denies speech changes, memory or balance issues. Slight increase in headaches. Decrease sense in taste. Ringing of the ear and facial tingling.

## 2020-01-20 ENCOUNTER — TELEPHONE (OUTPATIENT)
Dept: NEUROLOGY | Facility: CLINIC | Age: 35
End: 2020-01-20

## 2020-01-20 NOTE — TELEPHONE ENCOUNTER
S-pt wanting to give update on medication. B- pt being followed for epilepsy and headaches. A-states she has been on medication for 2 days. States she gets headaches on/off anyway, does get chills on/off prior to being on medication.  Has looked at s

## 2020-01-20 NOTE — TELEPHONE ENCOUNTER
At this time, I would recommend to continue monitoring and continue taking the medication and let us know I getting worse.

## 2020-01-21 NOTE — TELEPHONE ENCOUNTER
Patient notified of Dr Mildred Gamez recommendation to continue Lamotrigine and monitoring. Advised to call if Sx increase. Patient verbalized understanding.

## 2020-02-02 ENCOUNTER — TELEPHONE (OUTPATIENT)
Dept: NEUROLOGY | Facility: CLINIC | Age: 35
End: 2020-02-02

## 2020-02-02 ENCOUNTER — HOSPITAL ENCOUNTER (EMERGENCY)
Age: 35
Discharge: HOME | End: 2020-02-02
Attending: EMERGENCY MEDICINE
Payer: MEDICAID

## 2020-02-02 VITALS
WEIGHT: 150 LBS | HEIGHT: 63 IN | OXYGEN SATURATION: 98 % | DIASTOLIC BLOOD PRESSURE: 92 MMHG | HEART RATE: 89 BPM | TEMPERATURE: 98 F | BODY MASS INDEX: 26.58 KG/M2 | SYSTOLIC BLOOD PRESSURE: 143 MMHG | RESPIRATION RATE: 18 BRPM

## 2020-02-02 DIAGNOSIS — T78.40XA ALLERGIC REACTION, INITIAL ENCOUNTER: Primary | ICD-10-CM

## 2020-02-02 PROCEDURE — 99283 EMERGENCY DEPT VISIT LOW MDM: CPT

## 2020-02-02 PROCEDURE — 99282 EMERGENCY DEPT VISIT SF MDM: CPT

## 2020-02-03 ENCOUNTER — TELEPHONE (OUTPATIENT)
Dept: NEUROLOGY | Facility: CLINIC | Age: 35
End: 2020-02-03

## 2020-02-03 DIAGNOSIS — G40.109 SYMPTOMATIC LOCALIZATION-RELATED EPILEPSY (HCC): Primary | ICD-10-CM

## 2020-02-03 RX ORDER — DIVALPROEX SODIUM 250 MG/1
TABLET, DELAYED RELEASE ORAL
Qty: 120 TABLET | Refills: 1 | Status: SHIPPED | OUTPATIENT
Start: 2020-02-03 | End: 2020-02-03

## 2020-02-03 RX ORDER — OXCARBAZEPINE 300 MG/1
300 TABLET, FILM COATED ORAL 2 TIMES DAILY
Qty: 60 TABLET | Refills: 0 | Status: SHIPPED | OUTPATIENT
Start: 2020-02-03 | End: 2020-03-06

## 2020-02-03 NOTE — ED INITIAL ASSESSMENT (HPI)
Pt started on lamotrigine for her seizure. Today was the first day she doubled her dose, pt c/o heart racing, beni, throat itchy. Spoke to dr Melodie Cabral and advised to come to ed.

## 2020-02-03 NOTE — TELEPHONE ENCOUNTER
Spoke with pt, relayed note below, she expressed understanding. Pt states she is not interested in Depakote due to researched side effects, she state she would like to try a different seizure medication.     Advised pt that note would be sent on to violeta

## 2020-02-03 NOTE — TELEPHONE ENCOUNTER
I recommend to wait until SOB resolves, and go to ED if if does not. If having SOB more than 1 day, medication should've cleared by then, and SOB may be from something else.  Please add to allergy list. After at least 1 week off medication and symptoms reso

## 2020-02-03 NOTE — ED PROVIDER NOTES
Patient Seen in: THE [facility] Emergency Department In [place]      History   Patient presents with:  Dyspnea PABLO SOB    Stated Complaint: Patient presents to the ER with concerns for an allergic reaction after doublin*    HPI    14-year-old female with a hi LIGATION                      Social History    Tobacco Use      Smoking status: Former Smoker        Packs/day: 0.50        Years: 6.00        Pack years: 3        Types: Cigarettes        Quit date: 2014        Years since quittin.0      [place] Comprehensive verbal and written discharge and follow-up instructions were provided to help prevent relapse or worsening.   Patient was instructed to follow-up with her primary care provider for further evaluation and treatment, but to return immediately to

## 2020-02-03 NOTE — TELEPHONE ENCOUNTER
Should note, all seizure medications are going to have various side effects noted, and are possible. I would recommend initiation of Trileptal, 300mg BID if she agrees.  We may need to increase dose, but first, a Trileptal level in 1 week after initiation i

## 2020-02-03 NOTE — TELEPHONE ENCOUNTER
Spoke with pt, relayed note below. She expressed understanding and was agreeable to plan. Orders for Trileptal 300mg bid, and trileptal lab pended for provider signature. Discussed medication side effects with pt.     Advised pt to call office with any f

## 2020-02-03 NOTE — TELEPHONE ENCOUNTER
Patient called stating that she is having SOB and her throat feels itchy since she went up on lamictal today. No lip/tongue swelling, wheezing, rashes    Advised patient to stop Lamictal, take benadryl if she has any at home and go to the ED/UC immediately for severe allergic reaction    Patient expressed understanding.

## 2020-02-03 NOTE — TELEPHONE ENCOUNTER
Spoke to pt informed her that per Dr. Rojas Comfort note below she can start Trileptal in 1 day after resolution of symptoms. Pt expressed understanding, and was encouraged to call office back with further questions or concerns.

## 2020-02-03 NOTE — TELEPHONE ENCOUNTER
Per PSR note:    Pt stop taking lamotrigene - pt having shortness of breath    S: Allergic reaction. Was seen in ER 2/2/2020. B: Stopped Lamictal last evening after taking first 50mg Lamicteal dose.  . MFS:7/98/4281.       Discussed nature of seizures, p

## 2020-03-06 ENCOUNTER — OFFICE VISIT (OUTPATIENT)
Dept: INTERNAL MEDICINE CLINIC | Facility: CLINIC | Age: 35
End: 2020-03-06
Payer: MEDICAID

## 2020-03-06 ENCOUNTER — LAB ENCOUNTER (OUTPATIENT)
Dept: LAB | Age: 35
End: 2020-03-06
Attending: NURSE PRACTITIONER
Payer: MEDICAID

## 2020-03-06 VITALS
SYSTOLIC BLOOD PRESSURE: 108 MMHG | BODY MASS INDEX: 26.46 KG/M2 | WEIGHT: 155 LBS | HEART RATE: 64 BPM | RESPIRATION RATE: 16 BRPM | DIASTOLIC BLOOD PRESSURE: 74 MMHG | HEIGHT: 64 IN | TEMPERATURE: 98 F

## 2020-03-06 DIAGNOSIS — Z12.4 SCREENING FOR CERVICAL CANCER: ICD-10-CM

## 2020-03-06 DIAGNOSIS — Z00.00 ENCOUNTER FOR ROUTINE ADULT MEDICAL EXAMINATION: Primary | ICD-10-CM

## 2020-03-06 DIAGNOSIS — Z00.00 LABORATORY EXAM ORDERED AS PART OF ROUTINE GENERAL MEDICAL EXAMINATION: ICD-10-CM

## 2020-03-06 DIAGNOSIS — Q28.2 AVM (ARTERIOVENOUS MALFORMATION) BRAIN: Primary | ICD-10-CM

## 2020-03-06 LAB
ANION GAP SERPL CALC-SCNC: 6 MMOL/L (ref 0–18)
APTT PPP: 45.2 SECONDS (ref 25.4–36.1)
BUN BLD-MCNC: 11 MG/DL (ref 7–18)
BUN/CREAT SERPL: 12.8 (ref 10–20)
CALCIUM BLD-MCNC: 8.8 MG/DL (ref 8.5–10.1)
CHLORIDE SERPL-SCNC: 111 MMOL/L (ref 98–112)
CHOLEST SMN-MCNC: 160 MG/DL (ref ?–200)
CO2 SERPL-SCNC: 24 MMOL/L (ref 21–32)
CREAT BLD-MCNC: 0.86 MG/DL (ref 0.55–1.02)
DEPRECATED RDW RBC AUTO: 41.1 FL (ref 35.1–46.3)
ERYTHROCYTE [DISTWIDTH] IN BLOOD BY AUTOMATED COUNT: 12.5 % (ref 11–15)
GLUCOSE BLD-MCNC: 92 MG/DL (ref 70–99)
HCT VFR BLD AUTO: 41.5 % (ref 35–48)
HDLC SERPL-MCNC: 51 MG/DL (ref 40–59)
HGB BLD-MCNC: 13.5 G/DL (ref 12–16)
INR BLD: 1 (ref 0.9–1.1)
LDLC SERPL CALC-MCNC: 98 MG/DL (ref ?–100)
MCH RBC QN AUTO: 29.2 PG (ref 26–34)
MCHC RBC AUTO-ENTMCNC: 32.5 G/DL (ref 31–37)
MCV RBC AUTO: 89.6 FL (ref 80–100)
NONHDLC SERPL-MCNC: 109 MG/DL (ref ?–130)
OSMOLALITY SERPL CALC.SUM OF ELEC: 291 MOSM/KG (ref 275–295)
PATIENT FASTING Y/N/NP: YES
PATIENT FASTING Y/N/NP: YES
PLATELET # BLD AUTO: 342 10(3)UL (ref 150–450)
POTASSIUM SERPL-SCNC: 3.7 MMOL/L (ref 3.5–5.1)
PSA SERPL DL<=0.01 NG/ML-MCNC: 13.6 SECONDS (ref 12.5–14.7)
RBC # BLD AUTO: 4.63 X10(6)UL (ref 3.8–5.3)
SODIUM SERPL-SCNC: 141 MMOL/L (ref 136–145)
TRIGL SERPL-MCNC: 55 MG/DL (ref 30–149)
VLDLC SERPL CALC-MCNC: 11 MG/DL (ref 0–30)
WBC # BLD AUTO: 7 X10(3) UL (ref 4–11)

## 2020-03-06 PROCEDURE — 85730 THROMBOPLASTIN TIME PARTIAL: CPT

## 2020-03-06 PROCEDURE — 85027 COMPLETE CBC AUTOMATED: CPT

## 2020-03-06 PROCEDURE — 85610 PROTHROMBIN TIME: CPT

## 2020-03-06 PROCEDURE — 36415 COLL VENOUS BLD VENIPUNCTURE: CPT

## 2020-03-06 PROCEDURE — 87624 HPV HI-RISK TYP POOLED RSLT: CPT | Performed by: INTERNAL MEDICINE

## 2020-03-06 PROCEDURE — 80061 LIPID PANEL: CPT

## 2020-03-06 PROCEDURE — 88175 CYTOPATH C/V AUTO FLUID REDO: CPT | Performed by: INTERNAL MEDICINE

## 2020-03-06 PROCEDURE — 80048 BASIC METABOLIC PNL TOTAL CA: CPT

## 2020-03-06 PROCEDURE — 99385 PREV VISIT NEW AGE 18-39: CPT | Performed by: INTERNAL MEDICINE

## 2020-03-06 RX ORDER — ZONISAMIDE 100 MG/1
CAPSULE ORAL
COMMUNITY
Start: 2020-02-06

## 2020-03-06 RX ORDER — ZONISAMIDE 100 MG/1
CAPSULE ORAL
Refills: 0 | Status: CANCELLED | OUTPATIENT
Start: 2020-03-06

## 2020-03-06 NOTE — PROGRESS NOTES
Stoney Chiang is a 30year old female. HPI:   Patient presents to establish care and a complete physical. Hasn't been to doctor in years. Last seen doctor during pregnancy. Gave birth to son 4 years via C/s. .  No complications during pregnancy Pelvic mass    • PONV (postoperative nausea and vomiting)    • Seizure disorder [facility])       Past Surgical History:   Procedure Laterality Date   • ANGIOGRAM      10/22/14 at [address]   •      • LAPAROTOMY OVARIAN CYSTECTOMY Right 2014    Performed without erythema. MOUTH: Moist mucous membranes. Normal dentition. No exudate visualized. Posterior OP not erythematous. No trismus. NECK: Supple, Normal ROM. No carotid bruits auscultated. No cervical lymphadenopathy.   CARDIOVASCULAR: RRR, normal S1/S2 ordered as part of routine general medical examination  Patient got CBC and BMP earlier in the day per neurology.     - LIPID PANEL; Future  - TSH W REFLEX TO FREE T4; Future    4.  Screening for cervical cancer  - THINPREP PAP WITH HPV REFLEX REQUEST B; Fu

## 2020-03-06 NOTE — PROGRESS NOTES
[facility] Internal Medicine Office Note  Chief Complaint:   Patient presents with:  New Patient  CPX      HPI:   This is a 30year old female coming in for physical  HPI    LMP 2/20/20  Abnormal pap years ago     Dr. Yoselin Dowell Neurology [facility] foll COMMENTS)    Comment:Sneezing and watery eyes  Current Outpatient Medications   Medication Sig Dispense Refill   • zonisamide 100 MG Oral Cap TK 3 CS PO D           REVIEW OF SYSTEMS:   Review of Systems   Constitutional: Negative for fever.    HENT: Perla Lee year old female with  Encounter for routine adult medical examination  (primary encounter diagnosis)  Seizures ([facility] [address]  Laboratory exam ordered as part of routine general medical examination  Screening for cervical cancer      The plan is as follows  Harriet

## 2020-03-10 LAB — HPV I/H RISK 1 DNA SPEC QL NAA+PROBE: NEGATIVE

## 2020-03-12 ENCOUNTER — LAB ENCOUNTER (OUTPATIENT)
Dept: LAB | Age: 35
End: 2020-03-12
Attending: NURSE PRACTITIONER
Payer: MEDICAID

## 2020-03-12 DIAGNOSIS — Q28.2 AVM (ARTERIOVENOUS MALFORMATION) BRAIN: Primary | ICD-10-CM

## 2020-03-12 DIAGNOSIS — Z00.00 LABORATORY EXAM ORDERED AS PART OF ROUTINE GENERAL MEDICAL EXAMINATION: ICD-10-CM

## 2020-03-12 LAB
APTT PPP: 35.5 SECONDS (ref 25.4–36.1)
TSI SER-ACNC: 1.57 MIU/ML (ref 0.36–3.74)

## 2020-03-12 PROCEDURE — 84443 ASSAY THYROID STIM HORMONE: CPT

## 2020-03-12 PROCEDURE — 36415 COLL VENOUS BLD VENIPUNCTURE: CPT

## 2020-03-12 PROCEDURE — 85730 THROMBOPLASTIN TIME PARTIAL: CPT

## 2021-01-15 ENCOUNTER — TELEPHONE (OUTPATIENT)
Dept: NEUROLOGY | Facility: CLINIC | Age: 36
End: 2021-01-15

## 2021-11-13 ENCOUNTER — OFFICE VISIT (OUTPATIENT)
Dept: INTERNAL MEDICINE CLINIC | Facility: CLINIC | Age: 36
End: 2021-11-13
Payer: MEDICAID

## 2021-11-13 ENCOUNTER — LAB ENCOUNTER (OUTPATIENT)
Dept: LAB | Age: 36
End: 2021-11-13
Attending: INTERNAL MEDICINE
Payer: MEDICAID

## 2021-11-13 VITALS
DIASTOLIC BLOOD PRESSURE: 70 MMHG | BODY MASS INDEX: 23.93 KG/M2 | HEIGHT: 64 IN | HEART RATE: 88 BPM | OXYGEN SATURATION: 100 % | RESPIRATION RATE: 14 BRPM | SYSTOLIC BLOOD PRESSURE: 116 MMHG | WEIGHT: 140.19 LBS | TEMPERATURE: 99 F

## 2021-11-13 DIAGNOSIS — Q28.2 AVM (ARTERIOVENOUS MALFORMATION) BRAIN: ICD-10-CM

## 2021-11-13 DIAGNOSIS — N92.6 MENSES, IRREGULAR: ICD-10-CM

## 2021-11-13 DIAGNOSIS — R56.9 FOCAL SEIZURES (HCC): Chronic | ICD-10-CM

## 2021-11-13 DIAGNOSIS — G43.809 OTHER MIGRAINE WITHOUT STATUS MIGRAINOSUS, NOT INTRACTABLE: ICD-10-CM

## 2021-11-13 DIAGNOSIS — Z00.00 ROUTINE PHYSICAL EXAMINATION: Primary | ICD-10-CM

## 2021-11-13 PROCEDURE — 84450 TRANSFERASE (AST) (SGOT): CPT | Performed by: INTERNAL MEDICINE

## 2021-11-13 PROCEDURE — 83036 HEMOGLOBIN GLYCOSYLATED A1C: CPT | Performed by: INTERNAL MEDICINE

## 2021-11-13 PROCEDURE — 36415 COLL VENOUS BLD VENIPUNCTURE: CPT | Performed by: INTERNAL MEDICINE

## 2021-11-13 PROCEDURE — 80048 BASIC METABOLIC PNL TOTAL CA: CPT | Performed by: INTERNAL MEDICINE

## 2021-11-13 PROCEDURE — 85025 COMPLETE CBC W/AUTO DIFF WBC: CPT | Performed by: INTERNAL MEDICINE

## 2021-11-13 PROCEDURE — 84460 ALANINE AMINO (ALT) (SGPT): CPT | Performed by: INTERNAL MEDICINE

## 2021-11-13 PROCEDURE — 3074F SYST BP LT 130 MM HG: CPT | Performed by: INTERNAL MEDICINE

## 2021-11-13 PROCEDURE — 99395 PREV VISIT EST AGE 18-39: CPT | Performed by: INTERNAL MEDICINE

## 2021-11-13 PROCEDURE — 3008F BODY MASS INDEX DOCD: CPT | Performed by: INTERNAL MEDICINE

## 2021-11-13 PROCEDURE — 3078F DIAST BP <80 MM HG: CPT | Performed by: INTERNAL MEDICINE

## 2021-11-13 PROCEDURE — 80061 LIPID PANEL: CPT | Performed by: INTERNAL MEDICINE

## 2021-11-13 NOTE — PROGRESS NOTES
Patient Office Visit    ASSESSMENT AND PLAN:   (Z00.00) Routine physical examination  (primary encounter diagnosis)  Plan: AST (SGOT), ALT (SGPT), BASIC METABOLIC PANEL         (8), CBC WITH DIFFERENTIAL WITH PLATELET,         HEMOGLOBIN A1C, LIPID PANEL with:  CPX        HPI:   Mariam Zaman is a 31year old female who presents for a routine physical    1.  Change in menses:   - used to get her menses on a monthly basis   - last year her menses would come 3 days     Earlier every month   - this year, bipolar   • Psychiatric Sister         bipolar   • Psychiatric Sister         bipolar   • Diabetes Other         great aunts      Allergies:    Lamotrigine             SHORTNESS OF BREATH  Radiology Contrast *    OTHER (SEE COMMENTS)    Comment:Sneezing an

## 2021-11-15 ENCOUNTER — TELEPHONE (OUTPATIENT)
Dept: SURGERY | Facility: CLINIC | Age: 36
End: 2021-11-15

## 2021-11-15 DIAGNOSIS — Q27.30 AVM (ARTERIOVENOUS MALFORMATION): Primary | ICD-10-CM

## 2021-11-15 NOTE — TELEPHONE ENCOUNTER
Spoke to patient. She has made an apt to see Dr. Dawn Castillo 12/30 to follow up on her AVM. LOV was with Dr. Rosendo Costa in 2019, where he referred her to Dr. Lara Skiff for AVM treatment due to growth.  She states she saw Dr. Lara Skiff, who then referred her to Dr. David Mccullough

## 2021-11-15 NOTE — TELEPHONE ENCOUNTER
Discussed case with Dr. Serafin Benitez. Orders placed for MRI brain w/wo contrast, and noncontrast MRA. Please advise patient to complete these tests prior to her apt on December 30th. Thank you.

## 2021-11-27 ENCOUNTER — HOSPITAL ENCOUNTER (OUTPATIENT)
Dept: MRI IMAGING | Age: 36
Discharge: HOME | End: 2021-11-27
Payer: MEDICAID

## 2021-11-27 DIAGNOSIS — Q27.30 AVM (ARTERIOVENOUS MALFORMATION): ICD-10-CM

## 2021-11-27 PROCEDURE — A9575 INJ GADOTERATE MEGLUMI 0.1ML: HCPCS

## 2021-11-27 PROCEDURE — 70544 MR ANGIOGRAPHY HEAD W/O DYE: CPT

## 2021-11-27 PROCEDURE — 70553 MRI BRAIN STEM W/O & W/DYE: CPT

## 2021-12-30 ENCOUNTER — TELEMEDICINE (OUTPATIENT)
Dept: SURGERY | Facility: CLINIC | Age: 36
End: 2021-12-30
Payer: MEDICAID

## 2021-12-30 DIAGNOSIS — Q28.2 AVM (ARTERIOVENOUS MALFORMATION) BRAIN: Primary | ICD-10-CM

## 2021-12-30 PROCEDURE — 99215 OFFICE O/P EST HI 40 MIN: CPT

## 2021-12-30 NOTE — PROGRESS NOTES
[facility]  Interventional Neuroradiology Clinic Note        Josephus Eisenmenger, MD  Neurology  [facility]    Isidro Villalobos MD  [address] Lindsey Gonzalez MD  Department of Neurological Surgery  [facility] temporal vein with associated venous varix/aneurysm and venous outflow into the vein of Ger/left transverse-sigmoid sinus.   There was suggestion of mild interval enlargement of the draining temporal vein and varix on the angiogram, as well as increased s • AV Malformation, Dx. 2004    • Seizure disorder [facility])      Past Surgical History:   Procedure Laterality Date   •      • Embolization of AV malformation  2005   • Removal Ovarian Cyst (Left?)  2009   • TUBAL LIGATION       Social Hist 10% over nearly 3 years, we estimated the natural history of AVM rupture at 1-3% annually.  Further increased risk of this lesion is conferred by the fact that this symptomatic lesion has progressed clinically with increasing seizures and headaches (especia seek out consultation with the Radiation Oncology team at [facility] or downtown back with our group at [facility] as soon as possible.     Thank you for allowing us to participate in the care of this very interesting patien

## 2022-07-25 ENCOUNTER — TELEPHONE (OUTPATIENT)
Dept: INTERNAL MEDICINE CLINIC | Facility: CLINIC | Age: 37
End: 2022-07-25

## 2022-07-25 NOTE — TELEPHONE ENCOUNTER
Pt c/o Left ear ringing/popping. Denies chills, cough, fever, congestion, SOB. She tried flushing her ear however she did not notice any ear wax. Pt states this has been ongoing since 7/4. She lives 2 [place]/2 hours away so would like a rec if she needs an appointment before making one. Please advise.

## 2022-07-25 NOTE — TELEPHONE ENCOUNTER
OV advised for physical exam/evaluation of ear as this has been ongoing for 3 weeks. Can also go to a closer walk in clinic if she prefers.

## 2022-07-29 ENCOUNTER — TELEPHONE (OUTPATIENT)
Dept: SURGERY | Facility: CLINIC | Age: 37
End: 2022-07-29

## 2022-07-29 NOTE — TELEPHONE ENCOUNTER
Discussed with BIBIANA Lees who stated it was okay to take claritin but to avoid Sudaphed as it will raise her BP and she has an AVM. Called to inform patient. Patient appreciative.

## 2022-12-19 ENCOUNTER — TELEPHONE (OUTPATIENT)
Dept: INTERNAL MEDICINE CLINIC | Facility: CLINIC | Age: 37
End: 2022-12-19

## 2022-12-19 DIAGNOSIS — N93.9 VAGINAL SPOTTING: Primary | ICD-10-CM

## 2022-12-19 NOTE — TELEPHONE ENCOUNTER
Patient calling with questions as to what would cause spotting after regular cycle. Last menstrual period ended 12/16, noticed spotting today, brown in color. Reports loose stool and additional stress due to holiday. Does not see ob/gyne, tubal ligation approx 6 years ago. Also reports she has had 6 focal seizures since 12/16, most recent being right before call to this office today. Next appt with neuro 3/22/2022.

## 2022-12-19 NOTE — TELEPHONE ENCOUNTER
She needs to call neurology to discuss her focal seizures  I have referred her to gynecology  [address] [place] [address]

## 2023-03-22 ENCOUNTER — OFFICE VISIT (OUTPATIENT)
Dept: NEUROLOGY | Facility: CLINIC | Age: 38
End: 2023-03-22
Payer: MEDICAID

## 2023-03-22 ENCOUNTER — OFFICE VISIT (OUTPATIENT)
Dept: INTERNAL MEDICINE CLINIC | Facility: CLINIC | Age: 38
End: 2023-03-22
Payer: MEDICAID

## 2023-03-22 ENCOUNTER — LAB ENCOUNTER (OUTPATIENT)
Dept: LAB | Age: 38
End: 2023-03-22
Attending: INTERNAL MEDICINE
Payer: MEDICAID

## 2023-03-22 VITALS
WEIGHT: 142 LBS | HEART RATE: 68 BPM | BODY MASS INDEX: 24 KG/M2 | SYSTOLIC BLOOD PRESSURE: 112 MMHG | DIASTOLIC BLOOD PRESSURE: 72 MMHG | RESPIRATION RATE: 16 BRPM

## 2023-03-22 VITALS
TEMPERATURE: 98 F | WEIGHT: 143 LBS | OXYGEN SATURATION: 100 % | SYSTOLIC BLOOD PRESSURE: 107 MMHG | HEART RATE: 85 BPM | HEIGHT: 64 IN | RESPIRATION RATE: 14 BRPM | BODY MASS INDEX: 24.41 KG/M2 | DIASTOLIC BLOOD PRESSURE: 71 MMHG

## 2023-03-22 DIAGNOSIS — F41.9 ANXIETY: ICD-10-CM

## 2023-03-22 DIAGNOSIS — Q28.2 AVM (ARTERIOVENOUS MALFORMATION) BRAIN: ICD-10-CM

## 2023-03-22 DIAGNOSIS — R56.9 FOCAL SEIZURES (HCC): Chronic | ICD-10-CM

## 2023-03-22 DIAGNOSIS — G40.109 SYMPTOMATIC LOCALIZATION-RELATED EPILEPSY (HCC): Primary | ICD-10-CM

## 2023-03-22 DIAGNOSIS — G43.809 OTHER MIGRAINE WITHOUT STATUS MIGRAINOSUS, NOT INTRACTABLE: ICD-10-CM

## 2023-03-22 DIAGNOSIS — Z00.00 ROUTINE PHYSICAL EXAMINATION: Primary | ICD-10-CM

## 2023-03-22 PROBLEM — Z87.891 HISTORY OF TOBACCO USE: Status: ACTIVE | Noted: 2023-03-22

## 2023-03-22 LAB
ALT SERPL-CCNC: 18 U/L
ANION GAP SERPL CALC-SCNC: 9 MMOL/L (ref 0–18)
AST SERPL-CCNC: 16 U/L (ref 15–37)
BASOPHILS # BLD AUTO: 0.1 X10(3) UL (ref 0–0.2)
BASOPHILS NFR BLD AUTO: 1.1 %
BUN BLD-MCNC: 11 MG/DL (ref 7–18)
CALCIUM BLD-MCNC: 8.6 MG/DL (ref 8.5–10.1)
CHLORIDE SERPL-SCNC: 112 MMOL/L (ref 98–112)
CHOLEST SERPL-MCNC: 139 MG/DL (ref ?–200)
CO2 SERPL-SCNC: 21 MMOL/L (ref 21–32)
CREAT BLD-MCNC: 0.86 MG/DL
EOSINOPHIL # BLD AUTO: 0.32 X10(3) UL (ref 0–0.7)
EOSINOPHIL NFR BLD AUTO: 3.7 %
ERYTHROCYTE [DISTWIDTH] IN BLOOD BY AUTOMATED COUNT: 14.6 %
FASTING PATIENT LIPID ANSWER: NO
FASTING STATUS PATIENT QL REPORTED: NO
GFR SERPLBLD BASED ON 1.73 SQ M-ARVRAT: 90 ML/MIN/1.73M2 (ref 60–?)
GLUCOSE BLD-MCNC: 94 MG/DL (ref 70–99)
HCT VFR BLD AUTO: 40.3 %
HDLC SERPL-MCNC: 50 MG/DL (ref 40–59)
HGB BLD-MCNC: 12.7 G/DL
IMM GRANULOCYTES # BLD AUTO: 0.02 X10(3) UL (ref 0–1)
IMM GRANULOCYTES NFR BLD: 0.2 %
LDLC SERPL CALC-MCNC: 69 MG/DL (ref ?–100)
LYMPHOCYTES # BLD AUTO: 1.77 X10(3) UL (ref 1–4)
LYMPHOCYTES NFR BLD AUTO: 20.2 %
MCH RBC QN AUTO: 28.2 PG (ref 26–34)
MCHC RBC AUTO-ENTMCNC: 31.5 G/DL (ref 31–37)
MCV RBC AUTO: 89.6 FL
MONOCYTES # BLD AUTO: 0.7 X10(3) UL (ref 0.1–1)
MONOCYTES NFR BLD AUTO: 8 %
NEUTROPHILS # BLD AUTO: 5.85 X10 (3) UL (ref 1.5–7.7)
NEUTROPHILS # BLD AUTO: 5.85 X10(3) UL (ref 1.5–7.7)
NEUTROPHILS NFR BLD AUTO: 66.8 %
NONHDLC SERPL-MCNC: 89 MG/DL (ref ?–130)
OSMOLALITY SERPL CALC.SUM OF ELEC: 293 MOSM/KG (ref 275–295)
PLATELET # BLD AUTO: 344 10(3)UL (ref 150–450)
POTASSIUM SERPL-SCNC: 3.6 MMOL/L (ref 3.5–5.1)
RBC # BLD AUTO: 4.5 X10(6)UL
SODIUM SERPL-SCNC: 142 MMOL/L (ref 136–145)
TRIGL SERPL-MCNC: 111 MG/DL (ref 30–149)
TSI SER-ACNC: 0.71 MIU/ML (ref 0.36–3.74)
VLDLC SERPL CALC-MCNC: 17 MG/DL (ref 0–30)
WBC # BLD AUTO: 8.8 X10(3) UL (ref 4–11)

## 2023-03-22 PROCEDURE — 3074F SYST BP LT 130 MM HG: CPT | Performed by: OTHER

## 2023-03-22 PROCEDURE — 84443 ASSAY THYROID STIM HORMONE: CPT | Performed by: INTERNAL MEDICINE

## 2023-03-22 PROCEDURE — 84460 ALANINE AMINO (ALT) (SGPT): CPT | Performed by: INTERNAL MEDICINE

## 2023-03-22 PROCEDURE — 3008F BODY MASS INDEX DOCD: CPT | Performed by: INTERNAL MEDICINE

## 2023-03-22 PROCEDURE — 85025 COMPLETE CBC W/AUTO DIFF WBC: CPT | Performed by: INTERNAL MEDICINE

## 2023-03-22 PROCEDURE — 80061 LIPID PANEL: CPT | Performed by: INTERNAL MEDICINE

## 2023-03-22 PROCEDURE — 80048 BASIC METABOLIC PNL TOTAL CA: CPT | Performed by: INTERNAL MEDICINE

## 2023-03-22 PROCEDURE — 3074F SYST BP LT 130 MM HG: CPT | Performed by: INTERNAL MEDICINE

## 2023-03-22 PROCEDURE — 99395 PREV VISIT EST AGE 18-39: CPT | Performed by: INTERNAL MEDICINE

## 2023-03-22 PROCEDURE — 99244 OFF/OP CNSLTJ NEW/EST MOD 40: CPT | Performed by: OTHER

## 2023-03-22 PROCEDURE — 3078F DIAST BP <80 MM HG: CPT | Performed by: OTHER

## 2023-03-22 PROCEDURE — 84450 TRANSFERASE (AST) (SGOT): CPT | Performed by: INTERNAL MEDICINE

## 2023-03-22 PROCEDURE — 36415 COLL VENOUS BLD VENIPUNCTURE: CPT | Performed by: INTERNAL MEDICINE

## 2023-03-22 PROCEDURE — 3078F DIAST BP <80 MM HG: CPT | Performed by: INTERNAL MEDICINE

## 2023-03-22 RX ORDER — ZONISAMIDE 100 MG/1
CAPSULE ORAL
Qty: 180 CAPSULE | Refills: 5 | Status: SHIPPED | OUTPATIENT
Start: 2023-03-22

## 2023-03-22 NOTE — PROGRESS NOTES
Patient here to re-establish care for seizures. LOV 2020. On Zonisamide with former Neurologist (who has recently retired) and seizures are overall more stable on medication. Does still have about 1 episode a month, will have tingling on right side of arm/face.

## 2023-04-13 ENCOUNTER — TELEPHONE (OUTPATIENT)
Dept: SURGERY | Facility: CLINIC | Age: 38
End: 2023-04-13

## 2023-04-13 RX ORDER — ZONISAMIDE 100 MG/1
CAPSULE ORAL
Qty: 180 CAPSULE | Refills: 5 | Status: CANCELLED | OUTPATIENT
Start: 2023-04-13

## 2023-04-13 NOTE — TELEPHONE ENCOUNTER
Patient calling to request refill of zonisamide 100 MG [address] [facility], [address] [place] [address] Jayesh Tom 647, [phone], [phone]  Patient informed of 48 hour refill policy excluding weekends and holidays. Informed patient prescription is sent directly to pharmacy. Further explained patient will not receive a call back once prescription is ready.

## 2023-09-13 ENCOUNTER — TELEMEDICINE (OUTPATIENT)
Dept: NEUROLOGY | Facility: CLINIC | Age: 38
End: 2023-09-13
Payer: MEDICAID

## 2023-09-13 DIAGNOSIS — F41.9 ANXIETY: ICD-10-CM

## 2023-09-13 DIAGNOSIS — Q28.2 AVM (ARTERIOVENOUS MALFORMATION) BRAIN: ICD-10-CM

## 2023-09-13 DIAGNOSIS — G40.109 SYMPTOMATIC LOCALIZATION-RELATED EPILEPSY (HCC): Primary | ICD-10-CM

## 2023-09-13 PROCEDURE — 99213 OFFICE O/P EST LOW 20 MIN: CPT | Performed by: OTHER

## 2023-09-13 RX ORDER — OXCARBAZEPINE 300 MG/1
TABLET, FILM COATED ORAL
Qty: 90 TABLET | Refills: 1 | Status: SHIPPED | OUTPATIENT
Start: 2023-09-13

## 2023-09-15 ENCOUNTER — TELEPHONE (OUTPATIENT)
Dept: NEUROLOGY | Facility: CLINIC | Age: 38
End: 2023-09-15

## 2023-09-15 RX ORDER — OXCARBAZEPINE 300 MG/1
TABLET, FILM COATED ORAL
Qty: 270 TABLET | Refills: 0 | OUTPATIENT
Start: 2023-09-15

## 2023-12-27 DIAGNOSIS — F41.9 ANXIETY: ICD-10-CM

## 2023-12-27 RX ORDER — ZONISAMIDE 100 MG/1
CAPSULE ORAL
Qty: 180 CAPSULE | Refills: 2 | Status: SHIPPED | OUTPATIENT
Start: 2023-12-27

## 2023-12-27 NOTE — TELEPHONE ENCOUNTER
Medication: ZONISAMIDE 100 MG Oral Cap      Date of last refill: 10/02/2023 (#180/0)  [place]     Last office visit: 09/13/2023  Due back to clinic per last office note:  not stated  Date next office visit scheduled:    Future Appointments   Date Time Provider John Sandy   3/13/2024 10:00 AM Giacomo Crum, DO [facility] [place]   3/13/2024 12:55 PM Fely Rose, DO [facility] [place]           Last OV note recommendation:    ASSESSMENT/ACTIVE PROBLEM LIST:   Symptomatic localization-related epilepsy (hcc)  (primary encounter diagnosis)  Avm (arteriovenous malformation) brain  Anxiety     Discussion/Plan:  Left temporofrontal AVM and epilepsy-  Having partial seizures (sensory) a couple times a month including around menses, has not had better seizure control than this  Start trileptal, discussed a few possible side effects, and the need for bloodwork  Check trileptal level in 1 month  Check Zonisamide level   At this time, continue Zonisamide 600mg nightly as well  Continue to follow seizure precautions  Had discussion about recommending re-evaluation and treatment for AVM- strongly recommended follow up with Dr. Bharati Neely and further discussion, she has concerns about possible side effects of radiation treatment  Generalized anxiety disorder- recommend treatment of anxiety, especially since it appears to be affecting her frustration with decision making.  [facility] given [facility] visit, encouraged again to get treatment for this

## 2024-03-13 ENCOUNTER — OFFICE VISIT (OUTPATIENT)
Dept: NEUROLOGY | Facility: CLINIC | Age: 39
End: 2024-03-13
Payer: COMMERCIAL

## 2024-03-13 ENCOUNTER — LAB ENCOUNTER (OUTPATIENT)
Dept: LAB | Age: 39
End: 2024-03-13
Attending: Other
Payer: COMMERCIAL

## 2024-03-13 ENCOUNTER — TELEPHONE (OUTPATIENT)
Dept: SURGERY | Facility: CLINIC | Age: 39
End: 2024-03-13

## 2024-03-13 ENCOUNTER — OFFICE VISIT (OUTPATIENT)
Dept: INTERNAL MEDICINE CLINIC | Facility: CLINIC | Age: 39
End: 2024-03-13
Payer: COMMERCIAL

## 2024-03-13 VITALS
DIASTOLIC BLOOD PRESSURE: 64 MMHG | WEIGHT: 152.63 LBS | TEMPERATURE: 99 F | HEART RATE: 86 BPM | BODY MASS INDEX: 26.06 KG/M2 | RESPIRATION RATE: 14 BRPM | HEIGHT: 64 IN | OXYGEN SATURATION: 99 % | SYSTOLIC BLOOD PRESSURE: 116 MMHG

## 2024-03-13 VITALS — DIASTOLIC BLOOD PRESSURE: 70 MMHG | RESPIRATION RATE: 16 BRPM | HEART RATE: 78 BPM | SYSTOLIC BLOOD PRESSURE: 110 MMHG

## 2024-03-13 DIAGNOSIS — R56.9 FOCAL SEIZURES (HCC): Chronic | ICD-10-CM

## 2024-03-13 DIAGNOSIS — Z12.4 SCREENING FOR CERVICAL CANCER: ICD-10-CM

## 2024-03-13 DIAGNOSIS — Z00.00 ROUTINE PHYSICAL EXAMINATION: Primary | ICD-10-CM

## 2024-03-13 DIAGNOSIS — Q28.2 AVM (ARTERIOVENOUS MALFORMATION) BRAIN (HCC): ICD-10-CM

## 2024-03-13 DIAGNOSIS — F41.9 ANXIETY: ICD-10-CM

## 2024-03-13 DIAGNOSIS — G40.109 SYMPTOMATIC LOCALIZATION-RELATED EPILEPSY (HCC): ICD-10-CM

## 2024-03-13 DIAGNOSIS — G40.109 SYMPTOMATIC LOCALIZATION-RELATED EPILEPSY (HCC): Primary | ICD-10-CM

## 2024-03-13 DIAGNOSIS — Q27.30 AVM (ARTERIOVENOUS MALFORMATION) (HCC): Primary | ICD-10-CM

## 2024-03-13 PROBLEM — G43.809 OTHER MIGRAINE WITHOUT STATUS MIGRAINOSUS, NOT INTRACTABLE: Status: RESOLVED | Noted: 2019-11-06 | Resolved: 2024-03-13

## 2024-03-13 PROCEDURE — 80203 DRUG SCREEN QUANT ZONISAMIDE: CPT | Performed by: OTHER

## 2024-03-13 PROCEDURE — 3074F SYST BP LT 130 MM HG: CPT | Performed by: OTHER

## 2024-03-13 PROCEDURE — 88175 CYTOPATH C/V AUTO FLUID REDO: CPT | Performed by: INTERNAL MEDICINE

## 2024-03-13 PROCEDURE — 87624 HPV HI-RISK TYP POOLED RSLT: CPT | Performed by: INTERNAL MEDICINE

## 2024-03-13 PROCEDURE — 3078F DIAST BP <80 MM HG: CPT | Performed by: OTHER

## 2024-03-13 PROCEDURE — 99395 PREV VISIT EST AGE 18-39: CPT | Performed by: INTERNAL MEDICINE

## 2024-03-13 PROCEDURE — 99213 OFFICE O/P EST LOW 20 MIN: CPT | Performed by: OTHER

## 2024-03-13 PROCEDURE — 3008F BODY MASS INDEX DOCD: CPT | Performed by: INTERNAL MEDICINE

## 2024-03-13 PROCEDURE — 3078F DIAST BP <80 MM HG: CPT | Performed by: INTERNAL MEDICINE

## 2024-03-13 PROCEDURE — 3074F SYST BP LT 130 MM HG: CPT | Performed by: INTERNAL MEDICINE

## 2024-03-13 NOTE — PROGRESS NOTES
Patient here to follow up regarding seizures. Having approx 3 episodes per month but none so far this month.

## 2024-03-13 NOTE — PROGRESS NOTES
[facility]   Neurology    Harriet Del Cid Patient Status:  No patient class for patient encounter    1985 MRN [id number]   Location [facility] Milli Hightower,               HPI:   Harriet Del Cid is a(n) 38 year old female with history of headaches, and partial onset epilepsy and left frontal AVM diagnosed in  who presents for evaluation of seizures. She has a history of embolization of her AVM several years ago. She has a history of etoh abuse in the past. Seizures consist of migrating right arm and tongue paresthesias, with tachycardia, with full awareness. At times is LH right before her seizure. Last GTC was in 2019. She is following with Dr. Shaffer for the AVM, and he recently recommended intervention.   She was started on zonisamide, which controls her seizures the best, and has no side effects. Has seizures about 2-4 times a month, more often around her period, or if she stays up late or is dehydrated, or if she is sick. She has been on Keppra and had mood side effects. She currently is not abusing alcohol any more.     Interim  Still having 2-4 seizures per month, partial seizures with R paresthesias. At times not around her period. Has not been able to schedule yet with Dr. Anthony.  Interim  Patient here to follow up regarding seizures. Having approx 3 episodes per month but none so far this month, it is the 13th. Focal sensory R hand seizures last 1-2 minutes. Now seizures are less uncomfortable    Pertinent imaging and laboratory work-up:   EEG   IMPRESSION: Sharps and/or spikes in left parietal lobe is suggestive of a potential epileptogenic foci in that region     Past Medical History:  Past Medical History:   Diagnosis Date    ASCUS on Pap smear     AVM (arteriovenous malformation) (HCC)     OTHER DISEASES     varicella as a child    OTHER DISEASES     AV Malformation, Dx. 2004    Pelvic mass      PONV (postoperative nausea and vomiting)     Seizure disorder (HCC)         Past Surgical History:  Past Surgical History:   Procedure Laterality Date    ANGIOGRAM      10/22/14 at [facility]            2014    OTHER SURGICAL HISTORY  2005    Embolization of AV malformation    OTHER SURGICAL HISTORY  2009    Removal Ovarian Cyst (Left?)    REMOVAL OF OVARIAN CYST(S)      TUBAL LIGATION         Family History:  family history includes Cancer in her paternal grandfather; Diabetes in an other family member; Hypertension in her mother; Other in her father; Psychiatric in her sister, sister, and sister.      Social History:   reports that she quit smoking about 5 years ago. Her smoking use included cigarettes. She has a 10 pack-year smoking history. She has never used smokeless tobacco. She reports that she does not currently use alcohol. She reports that she does not currently use drugs after having used the following drugs: Cannabis.    Allergies:  Allergies   Allergen Reactions    Lamotrigine SHORTNESS OF BREATH    Radiology Contrast Iodinated Dyes OTHER (SEE COMMENTS)     Sneezing and watery eyes       MEDICATIONS:    Current Outpatient Medications:     zonisamide 100 MG Oral Cap, TAKE 6 CAPSULES BY MOUTH DAILY, Disp: 180 capsule, Rfl: 2    OXcarbazepine (TRILEPTAL) 300 MG Oral Tab, Week 1: take 1/2 tablet twice a day, Week 2: if tolerating, increase to 1 tablet twice a day. Week 3: if tolerating, increase to 1.5 tablets twice a day. If do not tolerate this dose, go back to 1 tablet twice a day. Then, in 1 week, get blood level drawn. (Patient not taking: Reported on 3/13/2024), Disp: 90 tablet, Rfl: 1      Review of Systems:   A comprehensive 10 point review of systems was completed.     Pertinent positives and negatives noted in the HPI.         PHYSICAL EXAM:   Neurologic Exam  Vitals  Vitals:    24 1307   BP: 110/70   Pulse: 78   Resp: 16     General Appearance: Patient is a 38 year  old female in no acute distress  Cardiac: Normal rate & regular rhythm  Skin: There are no rashes or other skin lesions.  Musculoskeletal: There is no scoliosis, or joint deformities  Neurologic examination:  Mental status: Patient is alert, attentive, and oriented x 3. Language is coherent and fluent without aphasia. Memory, comprehension and ability to follow commands were intact.   Cranial nerves II-XII: Optic discs were sharp. Pupils were round and reacted to light. Extraocular movements were full. There was no face, jaw, palate or tongue weakness or atrophy. Facial sensation was normal. Hearing was grossly intact. Shoulder shrug was normal.   Motor exam revealed normal muscle bulk and tone. No atrophy or fasciculations. Manual muscle testing revealed MRC grade 5/5 strength throughout including proximal and distal muscles of the arms and legs.  Deep tendon reflexes were 2 at the biceps, brachioradialis, triceps, knee jerk, and ankle jerk. Plantar responses were flexor bilaterally.   Sensory exam revealed normal light touch perception. Vibratory perception and proprioception were intact at the toes. Pinprick and temperature were normal. Romberg sign was absent.  Complex motor skills revealed normal coordination. Finger-nose-finger intact.   Gait was narrow and stable, was able to walk on heels, toes and tandem without any difficulty.     ASSESSMENT/ACTIVE PROBLEM LIST:     Encounter Diagnoses   Name Primary?    Symptomatic localization-related epilepsy (HCC) Yes    AVM (arteriovenous malformation) brain ([facility])     Anxiety      Discussion/Plan:  Left temporofrontal AVM and epilepsy-  Having partial seizures (sensory) a couple times a month including around menses, has not had better seizure control than this  Start trileptal (has not done this yet)  Check trileptal level in 1 month  Zonisamide level pending  At this time, continue Zonisamide 600mg nightly  Continue to follow seizure precautions  Had discussion  about recommending re-evaluation and treatment for AVM- strongly recommended follow up with Dr. Shaffer and further discussion and imaging, she previously has had concerns about possible side effects of radiation treatment. Has not had imaging for 3 years  Discussed that repeat imaging would be helpful to determine seizure risk     Generalized anxiety disorder- continue counseling, would consider trying medication again    Requested Prescriptions      No prescriptions requested or ordered in this encounter          We discussed in depth regarding the diagnosis, prognosis, treatment. The patient was given ample opportunity to ask questions. All questions and concerns were addressed.     Vidhya Hooker,   Neuromuscular and General Neurology  [facility]

## 2024-03-13 NOTE — PROGRESS NOTES
Patient Office Visit    ASSESSMENT AND PLAN:   1. Routine physical examination  Note: Continue to exercise at least 150 minutes a week and Eat a plant based diet. Please take 2000 IU of vitamin D daily for life to keep your bones strong. Please see a dermatologist yearly for skin checks and Please see your dentist every 6 months   - TSH [E]  - Lipid Panel  - ALT(SGPT)  - AST (SGOT)  - Basic Metabolic Panel (8) [E]    2. Focal seizures (HCC)  Note: Has an appointment with her neurologist today.  Currently on zonisamide    3. AVM (arteriovenous malformation) brain (HCC)  Note: Has seen Dr. Shaffer in the past and is not interested in seeing one right now even though it was highly recommended to follow-up with him.  Patient feels that she wants to get her anxiety under control before following up with him.  Discussed with patient that she does not need a referral and she can reach out to him anytime just to discuss the next steps    4. Screening for cervical cancer  - ThinPrep PAP with HPV Reflex Request B; Future  - ThinPrep PAP with HPV Reflex Request B    5. Anxiety  Note: Continue follow-up with the therapist      Return to clinic yearly for routine physical      Patient/Caregiver Education: Patient/Caregiver Education: There are no barriers to learning. Medical education done. Outcome: Patient verbalizes understanding. Patient is notified to call with any questions, complications, allergies, or worsening or changing symptoms.  Patient is to call with any side effects or complications from the treatments as a result of today.      Reviewed Past Medical History and   Patient Active Problem List   Diagnosis    ASCUS on Pap smear    AVM (arteriovenous malformation) brain (HCC)    Other migraine without status migrainosus, not intractable    Focal seizures (HCC)    History of tobacco use       Orders Placed This Encounter   Procedures    TSH [E]     Order Specific Question:   Release to patient     Answer:   Immediate     Lipid Panel    ALT(SGPT)    AST (SGOT)    Basic Metabolic Panel (8) [E]     Order Specific Question:   Release to patient     Answer:   Immediate     Requested Prescriptions      No prescriptions requested or ordered in this encounter         Milli Hightower DO  CC:  Chief Complaint   Patient presents with    Physical         HPI:   Harriet Del Cid is a 38-year-old female who presents for routine physical    Anxiety: Has been following up with a therapist which has really helped.  AVM: No headaches and for now she is decided not to pursue seeing the neuro interventionalist.   Seizures: Has been better controlled since her anxiety has been improved.  At her last menses she did not have any seizures.  She has an appointment with her neurologist today    Past Medical History:   Diagnosis Date    ASCUS on Pap smear     AVM (arteriovenous malformation) (HCC)     OTHER DISEASES     varicella as a child    OTHER DISEASES     AV Malformation, Dx. 2004    Pelvic mass     PONV (postoperative nausea and vomiting)     Seizure disorder (HCC)        Past Surgical History:   Procedure Laterality Date    ANGIOGRAM      10/22/14 at [facility]            2014    OTHER SURGICAL HISTORY  2005    Embolization of AV malformation    OTHER SURGICAL HISTORY  2009    Removal Ovarian Cyst (Left?)    REMOVAL OF OVARIAN CYST(S)      TUBAL LIGATION         Social History:  Social History     Socioeconomic History    Marital status: Single    Number of children: 0    Years of education: 12   Occupational History    Occupation: Correctional specialist   Tobacco Use    Smoking status: Former     Packs/day: 1.00     Years: 10.00     Additional pack years: 0.00     Total pack years: 10.00     Types: Cigarettes     Quit date: 2019     Years since quittin.2    Smokeless tobacco: Never    Tobacco comments:     on and of, 1 pack every 2 weeks   Vaping Use    Vaping Use: Never used   Substance and Sexual  Activity    Alcohol use: Not Currently     Comment: wine, quit in September 2019    Drug use: Not Currently     Types: Cannabis     Comment: 10 years    Sexual activity: Yes     Partners: Male     Birth control/protection: OCP   Other Topics Concern     Service No    Blood Transfusions No    Caffeine Concern No    Occupational Exposure No    Hobby Hazards No    Sleep Concern No    Stress Concern No    Weight Concern No    Special Diet No    Back Care No    Exercise No    Bike Helmet No    Seat Belt Yes    Self-Exams No     Family History:  Family History   Problem Relation Age of Onset    Other (Other) Father         alcoholism    Hypertension Mother     Cancer Paternal Grandfather     Psychiatric Sister         bipolar    Psychiatric Sister         bipolar    Psychiatric Sister         bipolar    Diabetes Other         great aunts      Allergies:  Allergies   Allergen Reactions    Lamotrigine SHORTNESS OF BREATH    Radiology Contrast Iodinated Dyes OTHER (SEE COMMENTS)     Sneezing and watery eyes     Current Meds:  Current Outpatient Medications on File Prior to Visit   Medication Sig Dispense Refill    zonisamide 100 MG Oral Cap TAKE 6 CAPSULES BY MOUTH DAILY 180 capsule 2    OXcarbazepine (TRILEPTAL) 300 MG Oral Tab Week 1: take 1/2 tablet twice a day, Week 2: if tolerating, increase to 1 tablet twice a day. Week 3: if tolerating, increase to 1.5 tablets twice a day. If do not tolerate this dose, go back to 1 tablet twice a day. Then, in 1 week, get blood level drawn. (Patient not taking: Reported on 3/13/2024) 90 tablet 1     No current facility-administered medications on file prior to visit.         REVIEW OF SYSTEMS   Constitutional: no fatigue normal energy no weight changes   HENT: normal sinuses and no mouth issues   Eyes: . normal vision no eye pain   Respiratory: normal respirations no cough   Cardiovascular: no CP, or palpitations   Gastrointestinal: normal bowels and no abd pains    Genitourinary:  normal urination no hematuria, no frequency   Musculoskeletal: no pains in arms/legs, normal range of motion   Skin: no rashes or skin lesions that are new   Neurological:  no weakness, no numbness, normal gait   Hematological:  no bruises or bleeding   Psychiatric/Behavioral: normal mood no anxiety normal behavior     /64 (BP Location: Left arm, Patient Position: Sitting, Cuff Size: adult)   Pulse 86   Temp 98.8 °F (37.1 °C) (Temporal)   Resp 14   Ht 5' 4\" (1.626 m)   Wt 152 lb 9.6 oz (69.2 kg)   LMP 03/03/2023 (Exact Date)   SpO2 99%   BMI 26.19 kg/m²     MA: Clarisse present in the room  PHYSICAL EXAM:   Constitutional: Vital signs reviewed as noted, well developed, in no acute distress.   HENT: NCAT, bilateral ear canal and tympanic membrane appear normal  Eyes: pupils reactive bilaterally  Neck: No thyroidmegaly  Cardiovascular: nl s1 s2 no m/r/g  Pulmonary/Chest: CTA bilaterally with no wheezes  Abdominal: Soft NT normal Bowel sounds  : normal external genitalia without skin lesions or rashes, normal cervix, no lesions, no abnormal discharge  Extremities: no pedal edema   Neurological:  no weakness in UE and LE, reflexes are normal  Skin: no rashes or bruises on visualized skin, not undressed   Psychiatric:normal mood

## 2024-03-13 NOTE — PATIENT INSTRUCTIONS
After your visit at the [place] office  today,  please direct any follow up questions or medication needs to the staff in our  [place] office so that your concerns may be promptly addressed.  We are available through [employer] or at the numbers below:    The phone number is:   [phone] option #1    The fax number is:  [phone]    Your pharmacy should also send any requests electronically to the [place] office.     Refill policies:    Allow 2-3 business days for refills; controlled substances may take longer.  Contact your pharmacy at least 5 days prior to running out of medication and have them send an electronic request or submit request through the “request refill” option in your [employer] account.  Refills are not addressed on weekends; covering physicians do not authorize routine medications on weekends.  No narcotics or controlled substances are refilled after noon on Fridays or by on call physicians.  By law, narcotics must be electronically prescribed.  A 30 day supply with no refills is the maximum allowed.  If your prescription is due for a refill, you may be due for a follow up appointment.  To best provide you care, patients receiving routine medications need to be seen at least once a year.  Patients receiving narcotic/controlled substance medications need to be seen at least once every 3 months.  In the event that your preferred pharmacy does not have the requested medication in stock (e.g. Backordered), it is your responsibility to find another pharmacy that has the requested medication available.  We will gladly send a new prescription to that pharmacy at your request.    Scheduling Tests:    If your physician has ordered radiology tests such as MRI or CT scans, please contact Central Scheduling at [phone] right away to schedule the test.  Once scheduled, the [facility] Centralized Referral Team will work with your insurance carrier to obtain pre-certification or prior authorization.   Depending on your insurance carrier, approval may take 3-10 days.  It is highly recommended patients assure they have received an authorization before having a test performed.  If test is done without insurance authorization, patient may be responsible for the entire amount billed.      Precertification and Prior Authorizations:  If your physician has recommended that you have a procedure or additional testing performed the [facility] Centralized Referral Team will contact your insurance carrier to obtain pre-certification or prior authorization.    You are strongly encouraged to contact your insurance carrier to verify that your procedure/test has been approved and is a COVERED benefit.  Although the [facility] Centralized Referral Team does its due diligence, the insurance carrier gives the disclaimer that \"Although the procedure is authorized, this does not guarantee payment.\"    Ultimately the patient is responsible for payment.   Thank you for your understanding in this matter.  Paperwork Completion:  If you require FMLA or disability paperwork for your recovery, please make sure to either drop it off or have it faxed to our office at [phone]. Be sure the form has your name and date of birth on it.  The form will be faxed to our Forms Department and they will complete it for you.  There is a 25$ fee for all forms that need to be filled out.  Please be aware there is a 10-14 day turnaround time.  You will need to sign a release of information (SAMANTHA) form if your paperwork does not come with one.  You may call the Forms Department with any questions at [phone].  Their fax number is [phone].

## 2024-03-13 NOTE — PATIENT INSTRUCTIONS
Continue to exercise at least 150 minutes a week and Eat a plant based diet     Please take 2000 IU of vitamin D daily for life to keep your bones strong    Please see a dermatologist yearly for skin checks   Please see your dentist every 6 months    I was able to add to your blood work to the blood test you had already done today    Continue follow-up with the neurologist    It does take a few days for the Pap smear results to come back    Continue follow-up with the therapist    In regards to Dr. Shaffer, you do not need a referral and I highly suggest reaching out to him just for follow-up.

## 2024-03-14 NOTE — TELEPHONE ENCOUNTER
Noted provider's request to have patient complete imaging prior to seeing Dr. Shaffer on 4.11.24.     Count includes the [facility] Future Appointments    Encounter Information   Provider Department Center   4/11/2024 10:00 AM Francis Shaffer MD, PhD       Called patient and informed her that BRUNA will need a disc at or before the time of her appointment, as she lives over 2 hours away and will obtain imaging near her home.     Patient stated that she will message the location and fax number to BRUNA once she gets the appointment scheduled and thanked Nursing for the call.

## 2024-03-14 NOTE — TELEPHONE ENCOUNTER
Noted the patient message listed below.     Noted the patient LOV 12/30/2021 with Dr. 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 She experienced interval progression in her symptoms in 2019 with headaches and grand mal seizures, that resolved in September 2021. Several concerns regarding the patient's presentation and AVM architecture have led our multidisciplinary team at [facility] to recommend intervention with stereotactic radiation over the years, but the patient was lost to follow-up post-partum with insurance issues and later due to the COVID pandemic.      Based on the [place] Prospective Database for superficial location/superficial venous drainage AVMs and the recent ARUBA trial data with a conglomerated rupture risk of 10% over nearly 3 years, we estimated the natural history of AVM rupture at 1-3% annually. Further increased risk of this lesion is conferred by the fact that this symptomatic lesion has progressed clinically with increasing seizures and headaches (especially over the last 3 months) prompting her to reconsult with us regarding treatment options.      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   However, we also explained the interventional risks of endovascular embolization, microsurgical resection and/or stereotactic radiation therapy. Again, stereotactic radiation therapy would be recommended as in the past due to the eloquent location of this AVM in the dominant left hemisphere adjacent to the pre-central gyrus to avoid the  iatrogenic injury risk with microsurgical resection. Endovascular embolization alone would be unlikely to achieve complete AVM obliteration requiring several staged procedures, but could be employed if there is any further increase in size of the protruding superloateral nidal aneurysm. Although stereotactic radiation therapy harbors the least periprocedural risk, it still requires at least 3-5 years for lesion involution/obliteration placing the patient at an interim risk for hemorraghic stroke/death complications that can approach 10% over 5 years as per Radha et al.      The patient agreed with our assessment and plan. Due to her proximity to the [facility], she preferred an option to be evaluated by our [facility] Neurosurgery and Radiation Oncology team at [facility] where she is also under the care of the Dr. Liriano of the Neurology service. We have provided her with the contact information for Heriberto Pete and Gemma and recommended she seek out consultation with the Radiation Oncology team at [facility] or downtown back with our group at [facility] as soon as possible.\"      Routed to the APRN for review and feedback

## 2024-03-14 NOTE — TELEPHONE ENCOUNTER
Orders have been placed for MRI brain w/wo and noncontrast MRA brain.     Please advise patient to obtain PRIOR to her scheduled apt with Dr. Shaffer on 4/11, otherwise she will need to be reschedule to sometime after this is obtained.     Thank you!

## 2024-03-14 NOTE — TELEPHONE ENCOUNTER
Spoke with patient over the phone to clarify treatment plan. Pt states she did discuss her condition with Radiation MDs at [facility], as well as neurosurgery, but has decided not to pursue treatment at this time.     She is looking to continue to monitor her known AVM.     Her last imaging to date was Nov/Dec 2021.     Will discuss with Dr. Shaffer regarding his imaging requirements and will reach out to the patient through [facility] to instruct her to obtain prior to her apt.

## 2024-03-15 LAB — ZONISAMIDE LVL: 60.2 UG/ML

## 2024-03-19 DIAGNOSIS — G40.109 SYMPTOMATIC LOCALIZATION-RELATED EPILEPSY (HCC): Primary | ICD-10-CM

## 2024-03-19 LAB
.: NORMAL
.: NORMAL

## 2024-03-20 LAB — HPV I/H RISK 1 DNA SPEC QL NAA+PROBE: NEGATIVE

## 2024-04-06 DIAGNOSIS — F41.9 ANXIETY: ICD-10-CM

## 2024-04-08 RX ORDER — ZONISAMIDE 100 MG/1
CAPSULE ORAL
Qty: 180 CAPSULE | Refills: 2 | Status: SHIPPED | OUTPATIENT
Start: 2024-04-08

## 2024-04-08 NOTE — TELEPHONE ENCOUNTER
Medication: zonisamide 100 MG Oral Cap      Date of last refill: 12/27/2023 (#180/2)  [place]     Last office visit: 03/13/2024  Due back to clinic per last office note:  Around 09/13/2024  Date next office visit scheduled:    Future Appointments   Date Time Provider Department Center   4/11/2024 10:00 AM Francis Shaffer MD, PhD ENINAPER2 [facility] [place]           Last OV note recommendation:    Discussion/Plan:  Left temporofrontal AVM and epilepsy-  Having partial seizures (sensory) a couple times a month including around menses, has not had better seizure control than this  Start trileptal (has not done this yet)  Check trileptal level in 1 month  Zonisamide level pending  At this time, continue Zonisamide 600mg nightly  Continue to follow seizure precautions  Had discussion about recommending re-evaluation and treatment for AVM- strongly recommended follow up with Dr. Shaffer and further discussion and imaging, she previously has had concerns about possible side effects of radiation treatment. Has not had imaging for 3 years  Discussed that repeat imaging would be helpful to determine seizure risk      Generalized anxiety disorder- continue counseling, would consider trying medication again

## 2024-04-09 ENCOUNTER — TELEPHONE (OUTPATIENT)
Dept: SURGERY | Facility: CLINIC | Age: 39
End: 2024-04-09

## 2024-04-09 NOTE — TELEPHONE ENCOUNTER
Pt r/s to 5/28/2024 for video visit as provider in sx on 4/11/2024, ok per Dr Shaffer, pt aware Dr will review imaging and will reach out if needed prior to appt in May.Pt understood and was thankful for option of video visit.

## 2024-04-09 NOTE — TELEPHONE ENCOUNTER
Rcvd imaging disc via mail MRA and MRV head w/o contrast DOS 04/05/24. MRI brain w/wo contrast DOS 04/05/24. Imaging loaded successfully to PACS and stored in drawer until next visit.

## 2024-04-09 NOTE — TELEPHONE ENCOUNTER
MRI brain w/wo contrast report and MRA and/or MRV Head w/o contrast report DOS 04/05/24  from [facility] received by MA clinical staff, endorsed to provider for review.     Placed on James's desk for review.

## 2024-04-11 ENCOUNTER — LAB ENCOUNTER (OUTPATIENT)
Dept: LAB | Age: 39
End: 2024-04-11
Attending: Other
Payer: COMMERCIAL

## 2024-04-11 DIAGNOSIS — G40.109 SYMPTOMATIC LOCALIZATION-RELATED EPILEPSY (HCC): ICD-10-CM

## 2024-04-11 LAB
ALT SERPL-CCNC: 20 U/L
ANION GAP SERPL CALC-SCNC: 3 MMOL/L (ref 0–18)
AST SERPL-CCNC: 15 U/L (ref ?–34)
BUN BLD-MCNC: 11 MG/DL (ref 9–23)
BUN/CREAT SERPL: 15.3 (ref 10–20)
CALCIUM BLD-MCNC: 9 MG/DL (ref 8.7–10.4)
CHLORIDE SERPL-SCNC: 115 MMOL/L (ref 98–112)
CHOLEST SERPL-MCNC: 160 MG/DL (ref ?–200)
CO2 SERPL-SCNC: 21 MMOL/L (ref 21–32)
CREAT BLD-MCNC: 0.72 MG/DL
EGFRCR SERPLBLD CKD-EPI 2021: 111 ML/MIN/1.73M2 (ref 60–?)
FASTING PATIENT LIPID ANSWER: NO
FASTING STATUS PATIENT QL REPORTED: NO
GLUCOSE BLD-MCNC: 77 MG/DL (ref 70–99)
HDLC SERPL-MCNC: 46 MG/DL (ref 40–59)
LDLC SERPL CALC-MCNC: 104 MG/DL (ref ?–100)
NONHDLC SERPL-MCNC: 114 MG/DL (ref ?–130)
OSMOLALITY SERPL CALC.SUM OF ELEC: 286 MOSM/KG (ref 275–295)
POTASSIUM SERPL-SCNC: 3.9 MMOL/L (ref 3.5–5.1)
SODIUM SERPL-SCNC: 139 MMOL/L (ref 136–145)
TRIGL SERPL-MCNC: 47 MG/DL (ref 30–149)
TSI SER-ACNC: 0.94 MIU/ML (ref 0.55–4.78)
VLDLC SERPL CALC-MCNC: 8 MG/DL (ref 0–30)

## 2024-04-11 PROCEDURE — 80061 LIPID PANEL: CPT | Performed by: INTERNAL MEDICINE

## 2024-04-11 PROCEDURE — 84460 ALANINE AMINO (ALT) (SGPT): CPT | Performed by: INTERNAL MEDICINE

## 2024-04-11 PROCEDURE — 80203 DRUG SCREEN QUANT ZONISAMIDE: CPT | Performed by: OTHER

## 2024-04-11 PROCEDURE — 84450 TRANSFERASE (AST) (SGOT): CPT | Performed by: INTERNAL MEDICINE

## 2024-04-11 PROCEDURE — 80183 DRUG SCRN QUANT OXCARBAZEPIN: CPT | Performed by: OTHER

## 2024-04-11 PROCEDURE — 80048 BASIC METABOLIC PNL TOTAL CA: CPT | Performed by: INTERNAL MEDICINE

## 2024-04-11 PROCEDURE — 84443 ASSAY THYROID STIM HORMONE: CPT | Performed by: INTERNAL MEDICINE

## 2024-04-16 LAB
OXCARBAZEPINE LVL: 5 UG/ML
ZONISAMIDE LVL: 49.6 UG/ML

## 2024-05-28 ENCOUNTER — TELEMEDICINE (OUTPATIENT)
Dept: SURGERY | Facility: CLINIC | Age: 39
End: 2024-05-28

## 2024-05-28 DIAGNOSIS — Q27.30 AVM (ARTERIOVENOUS MALFORMATION) (HCC): Primary | ICD-10-CM

## 2024-05-28 PROCEDURE — 99214 OFFICE O/P EST MOD 30 MIN: CPT
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
## 2024-05-29 ENCOUNTER — TELEPHONE (OUTPATIENT)
Dept: SURGERY | Facility: CLINIC | Age: 39
End: 2024-05-29

## 2024-05-29 NOTE — TELEPHONE ENCOUNTER
Pt reminder request from APRN noted below.    Pt reminder placed for \"MRI brain w/wo & MRA brain for May 2027\"    Nothing further needed at this time, closing encounter.

## 2024-07-09 DIAGNOSIS — F41.9 ANXIETY: ICD-10-CM

## 2024-07-10 DIAGNOSIS — F41.9 ANXIETY: ICD-10-CM

## 2024-07-10 RX ORDER — ZONISAMIDE 100 MG/1
CAPSULE ORAL
Qty: 180 CAPSULE | Refills: 1 | Status: SHIPPED | OUTPATIENT
Start: 2024-07-10

## 2024-07-10 NOTE — TELEPHONE ENCOUNTER
Medication: zonisamide 100 mg     Date of last refill: 04/08/2024 (#180/2)  [place]09[place]2024     Last office visit: 03/13/2024  Due back to clinic per last office note:  6 months  Date next office visit scheduled:    No future appointments.        Last OV note recommendation:    Discussion/Plan:  Left temporofrontal AVM and epilepsy-  Having partial seizures (sensory) a couple times a month including around menses, has not had better seizure control than this  Start trileptal (has not done this yet)  Check trileptal level in 1 month  Zonisamide level pending  At this time, continue Zonisamide 600mg nightly  Continue to follow seizure precautions  Had discussion about recommending re-evaluation and treatment for AVM- strongly recommended follow up with Dr. Shaffer and further discussion and imaging, she previously has had concerns about possible side effects of radiation treatment. Has not had imaging for 3 years  Discussed that repeat imaging would be helpful to determine seizure risk      Generalized anxiety disorder- continue counseling, would consider trying medication again

## 2024-07-12 ENCOUNTER — TELEPHONE (OUTPATIENT)
Dept: NEUROLOGY | Facility: CLINIC | Age: 39
End: 2024-07-12

## 2024-07-12 RX ORDER — ZONISAMIDE 100 MG/1
CAPSULE ORAL
Qty: 540 CAPSULE | Refills: 0 | OUTPATIENT
Start: 2024-07-12

## 2024-07-12 RX ORDER — ZONISAMIDE 100 MG/1
CAPSULE ORAL
Qty: 180 CAPSULE | Refills: 2 | OUTPATIENT
Start: 2024-07-12

## 2024-07-12 NOTE — TELEPHONE ENCOUNTER
Pt states she normally rec 90 day refills, the recent one was 30 days. Pt is wondering if she can rec a 90 refill. Please advise, Pt's best cb # [phone]. Endorsed to SHASTA.

## 2024-07-12 NOTE — TELEPHONE ENCOUNTER
Patient advised she is due for an appointment is September. Advised she will need to schedule an appointment that is due in September. She will call back to schedule appointment

## 2024-07-12 NOTE — TELEPHONE ENCOUNTER
Refused. Medication was recently sent and patient due in office around 09/2024.         Medication:  ZONISAMIDE 100 MG Oral Cap      Date of last refill: 07/10/2024 (#180/1)  [place]     Last office visit: 03/13/2024  Due back to clinic per last office note:  Around 09/13/2024  Date next office visit scheduled:    No future appointments.        Last OV note recommendation:    ASSESSMENT/ACTIVE PROBLEM LIST:           Encounter Diagnoses   Name Primary?    Symptomatic localization-related epilepsy (HCC) Yes    AVM (arteriovenous malformation) brain (HCC)      Anxiety        Discussion/Plan:  Left temporofrontal AVM and epilepsy-  Having partial seizures (sensory) a couple times a month including around menses, has not had better seizure control than this  Start trileptal (has not done this yet)  Check trileptal level in 1 month  Zonisamide level pending  At this time, continue Zonisamide 600mg nightly  Continue to follow seizure precautions  Had discussion about recommending re-evaluation and treatment for AVM- strongly recommended follow up with Dr. Shaffer and further discussion and imaging, she previously has had concerns about possible side effects of radiation treatment. Has not had imaging for 3 years  Discussed that repeat imaging would be helpful to determine seizure risk      Generalized anxiety disorder- continue counseling, would consider trying medication again

## 2024-07-22 ENCOUNTER — TELEPHONE (OUTPATIENT)
Dept: NEUROLOGY | Facility: CLINIC | Age: 39
End: 2024-07-22

## 2024-07-22 NOTE — TELEPHONE ENCOUNTER
Patient is requesting that apt on 08/01/2024 in [place] with Dr. Hooker be changed to a virtual apt. Please advise

## 2024-08-01 ENCOUNTER — TELEMEDICINE (OUTPATIENT)
Dept: NEUROLOGY | Facility: CLINIC | Age: 39
End: 2024-08-01
Payer: COMMERCIAL

## 2024-08-01 DIAGNOSIS — F41.9 ANXIETY: ICD-10-CM

## 2024-08-01 DIAGNOSIS — G40.109 SYMPTOMATIC LOCALIZATION-RELATED EPILEPSY (HCC): Primary | ICD-10-CM

## 2024-08-01 DIAGNOSIS — Q28.2 AVM (ARTERIOVENOUS MALFORMATION) BRAIN (HCC): ICD-10-CM

## 2024-08-01 PROCEDURE — 99213 OFFICE O/P EST LOW 20 MIN: CPT | Performed by: OTHER

## 2024-08-01 RX ORDER — ZONISAMIDE 100 MG/1
CAPSULE ORAL
Qty: 540 CAPSULE | Refills: 2 | Status: SHIPPED | OUTPATIENT
Start: 2024-08-01

## 2024-08-01 NOTE — PROGRESS NOTES
[facility] [place]   Neurology    Harriet Del Cid Patient Status:  No patient class for patient encounter    1985 MRN [id number]   Location [facility], [facility] PCP Milli Hightower DO              HPI:   Harriet Del Cid is a(n) 39 year old female with history of headaches, and partial onset epilepsy and left frontal AVM diagnosed in  who presents for evaluation of seizures. She has a history of embolization of her AVM several years ago. She has a history of etoh abuse in the past. Seizures consist of migrating right arm and tongue paresthesias, with tachycardia, with full awareness. At times is LH right before her seizure. Last GTC was in 2019. She is following with Dr. Shaffer for the AVM, and he recently recommended intervention.   She was started on zonisamide, which controls her seizures the best, and has no side effects. Has seizures about 2-4 times a month, more often around her period, or if she stays up late or is dehydrated, or if she is sick. She has been on Keppra and had mood side effects. She currently is not abusing alcohol any more.     Interim  Still having 2-4 seizures per month, partial seizures with R paresthesias. At times not around her period. Has not been able to schedule yet with Dr. Anthony.  Interim  Patient here to follow up regarding seizures. Having approx 3 episodes per month but none so far this month, it is the 13th. Focal sensory R hand seizures last 1-2 minutes. Now seizures are less uncomfortable  Interim  Tried trileptal, made her feel down. Tried for 2 weeks. She is taking zonisamide 300mg BID, 8/9 am and 8/9pm. Seizures have decreased in frequency. Last year focal seizures were 4-5 per month, but the last month did not have any focal seizures. Prior to this month, was having seizures 1-2 times a month.       Medications tried  Trileptal- mood change    Pertinent imaging and laboratory work-up:   MRI/MRA head  - AVM left temporal  Component      Latest Ref Rng 2024   Zonisamide Lvl      10.0 - 40.0 ug/mL 49.6 (H)    Above level is on Zonisamide 300mg BID 8/9am and 8/9pm      Component      Latest Ref Rng 3/13/2024   Zonisamide Lvl      10.0 - 40.0 ug/mL 60.2 (H)     Above is on Zonisamide 600mg nightly      Component      Latest Ref Rng 2024   CREATININE      0.55 - 1.02 mg/dL 0.72    BUN/CREATININE RATIO      10.0 - 20.0  15.3    CALCIUM      8.7 - 10.4 mg/dL 9.0    CALCULATED OSMOLALITY      275 - 295 mOsm/kg 286    EGFR      >=60 mL/min/1.73m2 111    Patient Fasting for BMP? No    Zonisamide Lvl      10.0 - 40.0 ug/mL    ALT (SGPT)      10 - 49 U/L 20    AST (SGOT)      <=34 U/L 15          EEG   IMPRESSION: Sharps and/or spikes in left parietal lobe is suggestive of a potential epileptogenic foci in that region     Past Medical History:  Past Medical History:    ASCUS on Pap smear    AVM (arteriovenous malformation) (HCC)    OTHER DISEASES    varicella as a child    OTHER DISEASES    AV Malformation, Dx. 2004    Pelvic mass    PONV (postoperative nausea and vomiting)    Seizure disorder (HCC)        Past Surgical History:  Past Surgical History:   Procedure Laterality Date    Angiogram      10/22/14 at [facility]            2014    Other surgical history  2005    Embolization of AV malformation    Other surgical history  2009    Removal Ovarian Cyst (Left?)    Removal of ovarian cyst(s)      Tubal ligation         Family History:  family history includes Cancer in her paternal grandfather; Diabetes in an other family member; Hypertension in her mother; Other in her father; Psychiatric in her sister, sister, and sister.      Social History:   reports that she quit smoking about 5 years ago. Her smoking use included cigarettes. She started smoking about 15 years ago. She has a 10 pack-year smoking history. She has never used smokeless tobacco. She reports that she does not  currently use alcohol. She reports that she does not currently use drugs after having used the following drugs: Cannabis.    Allergies:  Allergies   Allergen Reactions    Lamotrigine SHORTNESS OF BREATH    Radiology Contrast Iodinated Dyes OTHER (SEE COMMENTS)     Sneezing and watery eyes       MEDICATIONS:    Current Outpatient Medications:     zonisamide 100 MG Oral Cap, Take 3 capsules twice daily, Disp: 540 capsule, Rfl: 2      Review of Systems:   A comprehensive 10 point review of systems was completed.     Pertinent positives and negatives noted in the HPI.         PHYSICAL EXAM:   Neurologic Exam  Vitals  There were no vitals filed for this visit.    General Appearance: Patient is a 39 year old female in no acute distress  Cardiac: Normal rate & regular rhythm  Skin: There are no rashes or other skin lesions.  Musculoskeletal: There is no scoliosis, or joint deformities  Neurologic examination:  Mental status: Patient is alert, attentive, and oriented x 3. Language is coherent and fluent without aphasia. Memory, comprehension and ability to follow commands were intact.   Cranial nerves II-XII: Optic discs were sharp. Pupils were round and reacted to light. Extraocular movements were full. There was no face, jaw, palate or tongue weakness or atrophy. Facial sensation was normal. Hearing was grossly intact. Shoulder shrug was normal.   Motor exam revealed normal muscle bulk and tone. No atrophy or fasciculations. Manual muscle testing revealed MRC grade 5/5 strength throughout including proximal and distal muscles of the arms and legs.  Deep tendon reflexes were 2 at the biceps, brachioradialis, triceps, knee jerk, and ankle jerk. Plantar responses were flexor bilaterally.   Sensory exam revealed normal light touch perception. Vibratory perception and proprioception were intact at the toes. Pinprick and temperature were normal. Romberg sign was absent.  Complex motor skills revealed normal coordination.  Finger-nose-finger intact.   Gait was narrow and stable, was able to walk on heels, toes and tandem without any difficulty.     ASSESSMENT/ACTIVE PROBLEM LIST:     Encounter Diagnoses   Name Primary?    Symptomatic localization-related epilepsy (HCC) Yes    AVM (arteriovenous malformation) brain (HCC)     Anxiety      Discussion/Plan:  Left temporofrontal AVM and epilepsy-  Focal partial seizure frequency improved after changing zonisamide to BID  Continue zonisamide 300mg BID    Continue follow up with Dr. Shaffer for left temporal AVM, high risk given in eloquent area left temporal    Generalized anxiety disorder- counseling was previously recommended    Requested Prescriptions     Signed Prescriptions Disp Refills    zonisamide 100 MG Oral Cap 540 capsule 2     Sig: Take 3 capsules twice daily          We discussed in depth regarding the diagnosis, prognosis, treatment. The patient was given ample opportunity to ask questions. All questions and concerns were addressed.     Vidhya Hooker DO  Neuromuscular and General Neurology  [facility]

## 2025-04-02 ENCOUNTER — OFFICE VISIT (OUTPATIENT)
Dept: NEUROLOGY | Facility: CLINIC | Age: 40
End: 2025-04-02
Payer: COMMERCIAL

## 2025-04-02 VITALS
HEART RATE: 84 BPM | SYSTOLIC BLOOD PRESSURE: 106 MMHG | DIASTOLIC BLOOD PRESSURE: 74 MMHG | RESPIRATION RATE: 14 BRPM | WEIGHT: 156.31 LBS | BODY MASS INDEX: 27 KG/M2

## 2025-04-02 DIAGNOSIS — F41.1 GENERALIZED ANXIETY DISORDER: ICD-10-CM

## 2025-04-02 DIAGNOSIS — G40.109 SYMPTOMATIC LOCALIZATION-RELATED EPILEPSY (HCC): Primary | ICD-10-CM

## 2025-04-02 DIAGNOSIS — Q28.2 AVM (ARTERIOVENOUS MALFORMATION) BRAIN (HCC): ICD-10-CM

## 2025-04-02 DIAGNOSIS — F41.9 ANXIETY: ICD-10-CM

## 2025-04-02 PROCEDURE — 3074F SYST BP LT 130 MM HG: CPT | Performed by: OTHER

## 2025-04-02 PROCEDURE — 99215 OFFICE O/P EST HI 40 MIN: CPT | Performed by: OTHER

## 2025-04-02 PROCEDURE — 3078F DIAST BP <80 MM HG: CPT | Performed by: OTHER

## 2025-04-02 RX ORDER — ZONISAMIDE 100 MG/1
CAPSULE ORAL
Qty: 540 CAPSULE | Refills: 2 | Status: SHIPPED | OUTPATIENT
Start: 2025-04-02

## 2025-04-02 NOTE — PROGRESS NOTES
[facility] [place]   Neurology    Harriet Del Cid Patient Status:  No patient class for patient encounter    1985 MRN [id number]   Location [facility], [facility] PCP Milli Hightower,               HPI:   Harriet Del Cid is a(n) 39 year old female with history of headaches, and partial onset epilepsy and left frontal AVM diagnosed in  who presents for evaluation of seizures. She has a history of embolization of her AVM several years ago. She has a history of etoh abuse in the past. Seizures consist of migrating right arm and tongue paresthesias, with tachycardia, with full awareness. At times is LH right before her seizure. Last GTC was in 2019. She is following with Dr. Shaffer for the AVM, and he recently recommended intervention.   She was started on zonisamide, which controls her seizures the best, and has no side effects. Has seizures about 2-4 times a month, more often around her period, or if she stays up late or is dehydrated, or if she is sick. She has been on Keppra and had mood side effects. She currently is not abusing alcohol any more.     Interim  Still having 2-4 seizures per month, partial seizures with R paresthesias. At times not around her period. Has not been able to schedule yet with Dr. Anthony.  Interim  Patient here to follow up regarding seizures. Having approx 3 episodes per month but none so far this month, it is the 13th. Focal sensory R hand seizures last 1-2 minutes. Now seizures are less uncomfortable  Interim  Tried trileptal, made her feel down. Tried for 2 weeks. She is taking zonisamide 300mg BID, 8/9 am and 8/9pm. Seizures have decreased in frequency. Last year focal seizures were 4-5 per month, but the last month did not have any focal seizures. Prior to this month, was having seizures 1-2 times a month.   Interim  Since last visit, patient reports has months without right focal seizures. Overall less frequent.  Is getting more sleep now, around 7 hours per night. When does have a seizure, is usually around her period, or rarely during a stressful event/leading up to it associated with lack of sleep. Last GTC was in 2019. Focal seizures are a few days before the menstruation. Denies headaches.        Medications tried  Trileptal- mood change    Pertinent imaging and laboratory work-up:   MRI/MRA head - AVM left temporal  Component      Latest Ref Rng 2024   Zonisamide Lvl      10.0 - 40.0 ug/mL 49.6 (H)    Above level is on Zonisamide 300mg BID 8/9am and 89pm      Component      Latest Ref Rng 3/13/2024   Zonisamide Lvl      10.0 - 40.0 ug/mL 60.2 (H)     Above is on Zonisamide 600mg nightly      Component      Latest Ref Rng 2024   CREATININE      0.55 - 1.02 mg/dL 0.72    BUN/CREATININE RATIO      10.0 - 20.0  15.3    CALCIUM      8.7 - 10.4 mg/dL 9.0    CALCULATED OSMOLALITY      275 - 295 mOsm/kg 286    EGFR      >=60 mL/min/1.73m2 111    Patient Fasting for BMP? No    Zonisamide Lvl      10.0 - 40.0 ug/mL    ALT (SGPT)      10 - 49 U/L 20    AST (SGOT)      <=34 U/L 15          EEG   IMPRESSION: Sharps and/or spikes in left parietal lobe is suggestive of a potential epileptogenic foci in that region     Past Medical History:  Past Medical History:    ASCUS on Pap smear    AVM (arteriovenous malformation) (HCC)    OTHER DISEASES    varicella as a child    OTHER DISEASES    AV Malformation, Dx. 2004    Pelvic mass    PONV (postoperative nausea and vomiting)    Seizure disorder (HCC)        Past Surgical History:  Past Surgical History:   Procedure Laterality Date    Angiogram      10/22/14 at [facility]            2014    Other surgical history  2005    Embolization of AV malformation    Other surgical history  2009    Removal Ovarian Cyst (Left?)    Removal of ovarian cyst(s)      Tubal ligation         Family History:  family history includes Cancer in her paternal  grandfather; Diabetes in an other family member; Hypertension in her mother; Other in her father; Psychiatric in her sister, sister, and sister.      Social History:   reports that she quit smoking about 6 years ago. Her smoking use included cigarettes. She started smoking about 16 years ago. She has a 10 pack-year smoking history. She has never used smokeless tobacco. She reports that she does not currently use alcohol. She reports that she does not currently use drugs after having used the following drugs: Cannabis.    Allergies:  Allergies   Allergen Reactions    Lamotrigine SHORTNESS OF BREATH    Radiology Contrast Iodinated Dyes OTHER (SEE COMMENTS)     Sneezing and watery eyes       MEDICATIONS:    Current Outpatient Medications:     zonisamide 100 MG Oral Cap, Take 3 capsules twice daily, Disp: 540 capsule, Rfl: 2      Review of Systems:   A comprehensive 10 point review of systems was completed.     Pertinent positives and negatives noted in the HPI.         PHYSICAL EXAM:   Neurologic Exam  Vitals  Vitals:    04/02/25 0914   BP: 106/74   Pulse: 84   Resp: 14       General Appearance: Patient is a 39 year old female in no acute distress  Cardiac: Normal rate & regular rhythm  Skin: There are no rashes or other skin lesions.  Musculoskeletal: There is no scoliosis, or joint deformities  Neurologic examination:  Mental status: Patient is alert, attentive, and oriented x 3. Language is coherent and fluent without aphasia. Memory, comprehension and ability to follow commands were intact.   Cranial nerves II-XII: Optic discs were sharp. Pupils were round and reacted to light. Extraocular movements were full. There was no face, jaw, palate or tongue weakness or atrophy. Facial sensation was normal. Hearing was grossly intact. Shoulder shrug was normal.   Motor exam revealed normal muscle bulk and tone. No atrophy or fasciculations. Manual muscle testing revealed MRC grade 5/5 strength throughout including proximal  and distal muscles of the arms and legs.  Deep tendon reflexes were 2 at the biceps, brachioradialis, triceps, knee jerk, and ankle jerk. Plantar responses were flexor bilaterally.   Sensory exam revealed normal light touch perception. Vibratory perception and proprioception were intact at the toes. Pinprick and temperature were normal. Romberg sign was absent.  Complex motor skills revealed normal coordination. Finger-nose-finger intact.   Gait was narrow and stable, was able to walk on heels, toes and tandem without any difficulty.     ASSESSMENT/ACTIVE PROBLEM LIST:     Encounter Diagnoses   Name Primary?    Symptomatic localization-related epilepsy (HCC) Yes    AVM (arteriovenous malformation) brain (HCC)     Generalized anxiety disorder     Anxiety      Discussion/Plan:  Left temporofrontal AVM and epilepsy- last GTC was in 2019  Focal simple partial seizure frequency improved after changing zonisamide to BID  Continue zonisamide 300mg BID    Left temporal AVM, high risk given in eloquent area left temporal  MRI was recommended for 2027  Stereotactic treatment was recommended due to eloquent area, but since there is perioperative risk of hemorrhage, patient decided against treatment  Seizures continue to be controlled, no headaches  Follow up with Neuro IR    Generalized anxiety disorder-   Affects ADLs  Still prefers no medication or counseling- recommended this would be most helpful as part of a combined approach of self help (exercise, reading resources, mindfulness meditation), therapy and medication)  Start mindfulness meditation- headspace, calm, or mindfulness mikayla  Continue exercise routine  Additional resources were given, message sent after visit    Requested Prescriptions     Signed Prescriptions Disp Refills    zonisamide 100 MG Oral Cap 540 capsule 2     Sig: Take 3 capsules twice daily          We discussed in depth regarding the diagnosis, prognosis, treatment. The patient was given ample  opportunity to ask questions. All questions and concerns were addressed. 40 minutes were spent on this encounter, which included obtaining and reviewing history, examining the patient, reviewing and interpreting results, building a treatment plan, discussing treatment options, discussing medication instructions, educating the patient/family/caregiver, and completing documentation.      Vidhya Hooker DO  Neuromuscular and General Neurology  [facility]

## 2025-04-02 NOTE — PATIENT INSTRUCTIONS
Refill policies:    Allow 2-3 business days for refills; controlled substances may take longer.  Contact your pharmacy at least 5 days prior to running out of medication and have them send an electronic request or submit request through the “request refill” option in your [employer] account.  Refills are not addressed on weekends; covering physicians do not authorize routine medications on weekends.  No narcotics or controlled substances are refilled after noon on Fridays or by on call physicians.  By law, narcotics must be electronically prescribed.  A 30 day supply with no refills is the maximum allowed.  If your prescription is due for a refill, you may be due for a follow up appointment.  To best provide you care, patients receiving routine medications need to be seen at least once a year.  Patients receiving narcotic/controlled substance medications need to be seen at least once every 3 months.  In the event that your preferred pharmacy does not have the requested medication in stock (e.g. Backordered), it is your responsibility to find another pharmacy that has the requested medication available.  We will gladly send a new prescription to that pharmacy at your request.    Scheduling Tests:    If your physician has ordered radiology tests such as MRI or CT scans, please contact Central Scheduling at [phone] right away to schedule the test.  Once scheduled, the [facility] Centralized Referral Team will work with your insurance carrier to obtain pre-certification or prior authorization.  Depending on your insurance carrier, approval may take 3-10 days.  It is highly recommended patients assure they have received an authorization before having a test performed.  If test is done without insurance authorization, patient may be responsible for the entire amount billed.      Precertification and Prior Authorizations:  If your physician has recommended that you have a procedure or additional testing performed the [facility]  Centralized Referral Team will contact your insurance carrier to obtain pre-certification or prior authorization.    You are strongly encouraged to contact your insurance carrier to verify that your procedure/test has been approved and is a COVERED benefit.  Although the [facility] Centralized Referral Team does its due diligence, the insurance carrier gives the disclaimer that \"Although the procedure is authorized, this does not guarantee payment.\"    Ultimately the patient is responsible for payment.   Thank you for your understanding in this matter.  Paperwork Completion:  If you require FMLA or disability paperwork for your recovery, please make sure to either drop it off or have it faxed to our office at [phone]. Be sure the form has your name and date of birth on it.  The form will be faxed to our Forms Department and they will complete it for you.  There is a 25$ fee for all forms that need to be filled out.  Please be aware there is a 10-14 day turnaround time.  You will need to sign a release of information (SAMANTHA) form if your paperwork does not come with one.  You may call the Forms Department with any questions at [phone].  Their fax number is [phone].          Try Mindfulness meditation  Apps:  Mindfulness meditation [place]- blue background   Headspace  Calm

## (undated) NOTE — LETTER
Bernadette Harden [address] [address]  [place], [address] [facility]    Consent for Operation  Date: __________________                                Time: _______________    1.  I authorize the performance upon Harriet Del Cid the following operation:  cerebr procedure has been videotaped, the surgeon will obtain the original videotape. The hospital will not be responsible for storage or maintenance of this tape.   6. For the purpose of advancing medical education, I consent to the admittance of observers to the STATEMENTS REQUIRING INSERTION OR COMPLETION WERE FILLED IN.     Signature of Patient:   ___________________________    When the patient is a minor or mentally incompetent to give consent:  Signature of person authorized to consent for patient: ____________ supplements, and pills I can buy without a prescription (including street drugs/illegal medications). Failure to inform my anesthesiologist about these medicines may increase my risk of anesthetic complications. iv.  If I am allergic to anything or have ha Anesthesiologist Signature     Date   Time  I have discussed the procedure and information above with the patient (or patient’s representative) and answered their questions. The patient or their representative has agreed to have anesthesia services.     ___

## (undated) NOTE — LETTER
Date & Time: 7/8/2018, 9:08 AM  Patient: Tata Soto  Encounter Provider(s):    Girma Mao DO       To Whom It May Concern:    Kwabena White was seen and treated in our department on 7/8/2018. She may return to work on 7/10/18.     If you have

## (undated) NOTE — ED AVS SNAPSHOT
THE [facility] Emergency Department in [address] [facility]    Phone:  [phone]    Fax:  [address] [facility] [address] Abdulaziz Schaffer   MRN: [id number]    Department:  THE [facility] Emergency Department in [place]   Date of Vis You can get these medications from any pharmacy     Bring a paper prescription for each of these medications    - ondansetron 4 MG Tbdp            Discharge References/Attachments     HYPOKALEMIA (ENGLISH)    VOMITING AND DIARRHEA, NONSPECIFIC (ADULT) (ENG IF THERE IS ANY CHANGE OR WORSENING OF YOUR CONDITION, CALL YOUR PRIMARY CARE PHYSICIAN AT ONCE OR RETURN IMMEDIATELY TO THE EMERGENCY DEPARTMENT.     If you have been prescribed any medication(s), please fill your prescription right away and begin taking t Patient [address] to help you get signed up for insurance coverage. Patient [address] is a Federal Navigator program that can help with your Affordable Care Act coverage, as well as all types of Medicaid plans.   To get signed up and covere

## (undated) NOTE — ED AVS SNAPSHOT
Karyle Comings   MRN: [id number]    Department:  [facility] Emergency Department in [place]   Date of Visit:  2/2/2020           Disclosure     Insurance plans vary and the physician(s) referred by the ER may not be covered by your plan.  Please cont tell this physician (or your personal doctor if your instructions are to return to your personal doctor) about any new or lasting problems. The primary care or specialist physician will see patients referred from the [facility] Emergency Department.  Orlando Luna

## (undated) NOTE — LETTER
Consent to Procedure/Sedation    Date:/11/8/19   Time:1200    1. I authorize the performance upon Harriet Del Cid the following:      Cerebral Angiogram with possible closure device    2.  I authorize Dr. Jeana Olmos (and whomever is designated as the [employer] Witness: ____________________     Date: ______________    Printed: 2019   12:00 PM    Patient Name: Tristin David        : 1985       Medical Record #: [id number]

## (undated) NOTE — LETTER
Date & Time: 8/28/2019, 12:14 PM  Patient: Tabatha Mckenna  Encounter Provider(s):    SHELLY Madsen         This certifies that Vanesa Morillo, a patient at [facility] [address] [facility], am leaving the facility voluntarily a

## (undated) NOTE — ED AVS SNAPSHOT
Evelyn Dunn   MRN: [id number]    Department:  [facility] in [place]   Date of Visit:  7/8/2018           Disclosure     Insurance plans vary and the physician(s) referred by the ER may not be covered by your plan.  Please cont tell this physician (or your personal doctor if your instructions are to return to your personal doctor) about any new or lasting problems. The primary care or specialist physician will see patients referred from the [facility] Emergency Department.  Meme Bangura

## (undated) NOTE — ED AVS SNAPSHOT
Tatanahomy Baumannraissa   MRN: [id number]    Department:  [facility] Emergency Department   Date of Visit:  8/28/2019           Disclosure     Insurance plans vary and the physician(s) referred by the ER may not be covered by your plan.  Please contact tell this physician (or your personal doctor if your instructions are to return to your personal doctor) about any new or lasting problems. The primary care or specialist physician will see patients referred from the [facility] Emergency Department.  Ksenia Pettit

## (undated) NOTE — MR AVS SNAPSHOT
After Visit Summary   11/13/2021    Luisa Vincent   MRN: [id number]           Visit Information     Date & Time  11/13/2021  9:30 AM Provider  Vidhya Tariq  Department  [address] [facility], [facility] Dept.  Phone  234- credit, debit, or health savings card. Not active on [employer]? Ask us how to get signed up today! If you receive a survey from [employer], please take a few minutes to complete it and provide feedback.  We strive to deliver the best patient Adriana Haile

## (undated) NOTE — ED AVS SNAPSHOT
THE [facility] Emergency Department in [address] [facility]    Phone:  [phone]    Fax:  [phone] Nicci Mert   MRN: [id number]    Department:  THE [facility] Emergency Department in [place]   Date of Vis IF THERE IS ANY CHANGE OR WORSENING OF YOUR CONDITION, CALL YOUR PRIMARY CARE PHYSICIAN AT ONCE OR RETURN IMMEDIATELY TO THE EMERGENCY DEPARTMENT.     If you have been prescribed any medication(s), please fill your prescription right away and begin taking t